# Patient Record
Sex: FEMALE | Race: WHITE | NOT HISPANIC OR LATINO | Employment: FULL TIME | URBAN - METROPOLITAN AREA
[De-identification: names, ages, dates, MRNs, and addresses within clinical notes are randomized per-mention and may not be internally consistent; named-entity substitution may affect disease eponyms.]

---

## 2022-04-04 ENCOUNTER — OFFICE VISIT (OUTPATIENT)
Dept: FAMILY MEDICINE CLINIC | Facility: CLINIC | Age: 22
End: 2022-04-04

## 2022-04-04 VITALS
HEIGHT: 64 IN | TEMPERATURE: 98.3 F | OXYGEN SATURATION: 99 % | HEART RATE: 100 BPM | WEIGHT: 125 LBS | RESPIRATION RATE: 20 BRPM | DIASTOLIC BLOOD PRESSURE: 77 MMHG | SYSTOLIC BLOOD PRESSURE: 131 MMHG | BODY MASS INDEX: 21.34 KG/M2

## 2022-04-04 DIAGNOSIS — N89.8 SKIN TAG OF VAGINAL MUCOSA: ICD-10-CM

## 2022-04-04 DIAGNOSIS — F43.10 PTSD (POST-TRAUMATIC STRESS DISORDER): ICD-10-CM

## 2022-04-04 DIAGNOSIS — Z00.00 ANNUAL PHYSICAL EXAM: Primary | ICD-10-CM

## 2022-04-04 DIAGNOSIS — Z23 IMMUNIZATION DUE: ICD-10-CM

## 2022-04-04 DIAGNOSIS — F33.41 RECURRENT MAJOR DEPRESSIVE DISORDER, IN PARTIAL REMISSION (HCC): ICD-10-CM

## 2022-04-04 DIAGNOSIS — Z11.59 ENCOUNTER FOR HEPATITIS C SCREENING TEST FOR LOW RISK PATIENT: ICD-10-CM

## 2022-04-04 DIAGNOSIS — Z11.4 ENCOUNTER FOR SCREENING FOR HIV: ICD-10-CM

## 2022-04-04 DIAGNOSIS — F41.9 ANXIETY: ICD-10-CM

## 2022-04-04 PROCEDURE — 99385 PREV VISIT NEW AGE 18-39: CPT | Performed by: FAMILY MEDICINE

## 2022-04-04 PROCEDURE — 99213 OFFICE O/P EST LOW 20 MIN: CPT | Performed by: FAMILY MEDICINE

## 2022-04-04 PROCEDURE — 90471 IMMUNIZATION ADMIN: CPT | Performed by: FAMILY MEDICINE

## 2022-04-04 PROCEDURE — 90651 9VHPV VACCINE 2/3 DOSE IM: CPT | Performed by: FAMILY MEDICINE

## 2022-04-04 RX ORDER — HYDROXYZINE HYDROCHLORIDE 25 MG/1
25 TABLET, FILM COATED ORAL EVERY 6 HOURS PRN
Qty: 30 TABLET | Refills: 0 | Status: SHIPPED | OUTPATIENT
Start: 2022-04-04

## 2022-04-04 NOTE — ASSESSMENT & PLAN NOTE
Diagnosed in 2019, currently well-controlled with talk therapy with therapist consistently 2 times/month  Currently not medically treated    - Recommended consistent follow-up with therapist   - Follow-up as needed for symptoms management

## 2022-04-04 NOTE — ASSESSMENT & PLAN NOTE
Moderately controlled  JINA-7 score: 17  First diagnosed in 2019  Patient able to manage symptoms on most days without medication but has been prescribed Xanax 25mg and Adderall as needed  In the past, has only taken them 1-2 times a month and committed to reducing use  Follows with therapist for past 3 years twice a month     Plan:  - Counseled patient on deep breathing techniques to practice throughout the day and when anxiety attack is coming on  - Prescription for atarax 25 mg provided   - Follow-up in 1 year for annual visit or earlier if symptoms worsen

## 2022-04-04 NOTE — PROGRESS NOTES
Family Practice  Kurt Garcia 19 y o  female MRN: [id number]  Encounter: [id number]  4/4/2022      Assessment/Plan:     Kurt Garcia is a 19 y o  female     Problem List Items Addressed This Visit        Genitourinary    Skin tag of vaginal mucosa     Acute concern  Had been evaluated by her OB/GYN and was given topical treatment at the time  Tag is still present and patient is bothered by presence  Denies burning, itching, or discharge from vagina  Plan:  - Referral to gynecology for evaluation   - Follow-up at next office visit for additional concerns         Relevant Orders    Ambulatory Referral to Gynecology       Other    Anxiety     Moderately controlled  JINA-7 score: 17  First diagnosed in 2019  Patient able to manage symptoms on most days without medication but has been prescribed Xanax 25mg and Adderall as needed  In the past, has only taken them 1-2 times a month and committed to reducing use  Follows with therapist for past 3 years twice a month  Plan:  - Counseled patient on deep breathing techniques to practice throughout the day and when anxiety attack is coming on  - Prescription for atarax 25 mg provided   - Follow-up in 1 year for annual visit or earlier if symptoms worsen         Relevant Medications    hydrOXYzine HCL (ATARAX) 25 mg tablet    Recurrent major depressive disorder, in partial remission ([facility] [address] )     Diagnosed in 2019, currently well-controlled with talk therapy with therapist consistently 2 times/month  PHQ-9 negative for depression  Had been prescribed SSRI in the past which did not improve symptoms  Currently not medically treated  - Recommended consistent follow-up with therapist   - Follow-up in as needed for symptom management          Relevant Medications    hydrOXYzine HCL (ATARAX) 25 mg tablet    PTSD (post-traumatic stress disorder)     Diagnosed in 2019, currently well-controlled with talk therapy with therapist consistently 2 times/month   Currently not medically treated  - Recommended consistent follow-up with therapist   - Follow-up as needed for symptoms management          Relevant Medications    hydrOXYzine HCL (ATARAX) 25 mg tablet    Annual physical exam - Primary     Patient denies acute symptoms of concern  Received HPV vaccine (1 of 3) today in the office  Deferring HIV and Hep C screening until next round of blood work due  Patient is otherwise up to date on cervical cancer screening and STD testing per annual gyn visit 2 months ago  Other Visit Diagnoses     Encounter for hepatitis C screening test for low risk patient        Encounter for screening for HIV        Immunization due        Relevant Orders    HPV VACCINE 9 VALENT IM (Completed)                Follow up appointment: as needed   ________________________________________________________________________  Subjective:         HPI:  Yaneth Ricketts is a 19 y o  female presents to the office for management of anxiety  Patient has a history of anxiety, depression, and PTSD diagnosed in 2019  Prescribed Xanax 25mg as needed and which helped her and was taking it once a week  Adderal was prescribed monthly which she also used as needed  Not taking either right now, but would like a prescription for Xanax to have as a last resort when needed  Has a therapist she sees twice a month and talk therapy has helped control her depression and PTSD  Remains vigilant to use non-pharmacologic therapy to manage anxiety and use medications only when absolutely needed  Review of Systems   Constitutional: Negative for chills and fever  HENT: Negative for ear pain and sore throat  Eyes: Negative for pain and visual disturbance  Respiratory: Negative for cough and shortness of breath  Cardiovascular: Negative for chest pain and palpitations  Gastrointestinal: Negative for abdominal pain and vomiting  Genitourinary: Negative for dysuria and hematuria     Musculoskeletal: Negative for arthralgias and back pain  Skin: Negative for color change and rash  Reports sensitivity of skin (welting with minor trauma) for past 2 years since her shellfish allergy (hives) was diagnosed   Neurological: Negative for seizures and syncope  Psychiatric/Behavioral: Negative for agitation, behavioral problems, confusion and decreased concentration  The patient is nervous/anxious  All other systems reviewed and are negative  Historical Information   No past medical history on file  Past Surgical History:   Procedure Laterality Date    TONSILLECTOMY  2017     Social History   Social History     Substance and Sexual Activity   Alcohol Use Yes    Comment: Occ     Social History     Substance and Sexual Activity   Drug Use Never     Social History     Tobacco Use   Smoking Status Never Smoker   Smokeless Tobacco Never Used     Family History   Problem Relation Age of Onset    No Known Problems Mother     Hypertension Father          Current Outpatient Medications:     hydrOXYzine HCL (ATARAX) 25 mg tablet, Take 1 tablet (25 mg total) by mouth every 6 (six) hours as needed for allergies or anxiety, Disp: 30 tablet, Rfl: 0       Meds/Allergies   (Not in a hospital admission)    No current facility-administered medications for this visit  Allergies   Allergen Reactions    Shellfish-Derived Products - Food Allergy Hives     Varying degress of severity, unsure of mechanism         Objective:     Blood pressure 131/77, pulse 100, temperature 98 3 °F (36 8 °C), temperature source Temporal, resp  rate 20, height 5' 4 2" (1 631 m), weight 56 7 kg (125 lb), SpO2 99 %  Body mass index is 21 32 kg/m²  Physical Exam  Vitals and nursing note reviewed  Constitutional:       General: She is not in acute distress  Appearance: She is well-developed  HENT:      Head: Normocephalic and atraumatic     Eyes:      Conjunctiva/sclera: Conjunctivae normal    Cardiovascular:      Rate and Rhythm: Normal rate and regular rhythm  Heart sounds: No murmur heard  Pulmonary:      Effort: Pulmonary effort is normal  No respiratory distress  Breath sounds: Normal breath sounds  Abdominal:      Palpations: Abdomen is soft  Tenderness: There is no abdominal tenderness  Musculoskeletal:      Cervical back: Neck supple  Skin:     General: Skin is warm and dry  Neurological:      Mental Status: She is alert  Psychiatric:         Mood and Affect: Mood normal          Behavior: Behavior normal          Thought Content: Thought content normal          Judgment: Judgment normal        LAB RESULTS:   Discussed recent pertinent labs with patient  RADIOLOGY RESULTS: I have personally reviewed pertinent imaging studies  Discussed recent pertinent labs with patient

## 2022-04-04 NOTE — ASSESSMENT & PLAN NOTE
Diagnosed in 2019, currently well-controlled with talk therapy with therapist consistently 2 times/month  PHQ-9 negative for depression  Had been prescribed SSRI in the past which did not improve symptoms  Currently not medically treated    - Recommended consistent follow-up with therapist   - Follow-up in as needed for symptom management

## 2022-04-04 NOTE — PROGRESS NOTES
[address] [facility]    NAME: Diane Elizalde  AGE: 19 y o  SEX: female  : 2000     DATE: 2022     Assessment and Plan:     Problem List Items Addressed This Visit        Genitourinary    Skin tag of vaginal mucosa     Acute concern  Had been evaluated by her OB/GYN and was given topical treatment at the time  Tag is still present and patient is bothered by presence  Denies burning, itching, or discharge from vagina  Plan:  - Referral to gynecology for evaluation   - Follow-up at next office visit for additional concerns         Relevant Orders    Ambulatory Referral to Gynecology       Other    Anxiety     Moderately controlled  JINA-7 score: 17  First diagnosed in 2019  Patient able to manage symptoms on most days without medication but has been prescribed Xanax 25mg and Adderall as needed  In the past, has only taken them 1-2 times a month and committed to reducing use  Follows with therapist for past 3 years twice a month  Plan:  - Counseled patient on deep breathing techniques to practice throughout the day and when anxiety attack is coming on  - Prescription for atarax 25 mg provided   - Follow-up in 1 year for annual visit or earlier if symptoms worsen         Relevant Medications    hydrOXYzine HCL (ATARAX) 25 mg tablet    Recurrent major depressive disorder, in partial remission ([facility] [address] )     Diagnosed in 2019, currently well-controlled with talk therapy with therapist consistently 2 times/month  PHQ-9 negative for depression  Had been prescribed SSRI in the past which did not improve symptoms  Currently not medically treated    - Recommended consistent follow-up with therapist   - Follow-up in as needed for symptom management          Relevant Medications    hydrOXYzine HCL (ATARAX) 25 mg tablet    PTSD (post-traumatic stress disorder)     Diagnosed in 2019, currently well-controlled with talk therapy with therapist consistently 2 times/month  Currently not medically treated  - Recommended consistent follow-up with therapist   - Follow-up as needed for symptoms management          Relevant Medications    hydrOXYzine HCL (ATARAX) 25 mg tablet    Annual physical exam - Primary     Patient denies acute symptoms of concern  Received HPV vaccine (1 of 3) today in the office  Deferring HIV and Hep C screening until next round of blood work due  Patient is otherwise up to date on cervical cancer screening and STD testing per annual gyn visit 2 months ago  Other Visit Diagnoses     Encounter for hepatitis C screening test for low risk patient        Encounter for screening for HIV        Immunization due        Relevant Orders    HPV VACCINE 9 VALENT IM (Completed)          Immunizations and preventive care screenings were discussed with patient today  Appropriate education was printed on patient's after visit summary  Counseling:  Alcohol/drug use: discussed moderation in alcohol intake, the recommendations for healthy alcohol use, and avoidance of illicit drug use  Dental Health: discussed importance of regular tooth brushing, flossing, and dental visits  Sexual health: discussed sexually transmitted diseases, partner selection, use of condoms, avoidance of unintended pregnancy, and contraceptive alternatives  · Exercise: the importance of regular exercise/physical activity was discussed  Recommend exercise 3-5 times per week for at least 30 minutes  Return if symptoms worsen or fail to improve, for make follow up apt for nursing visit for HPV in 6  months  Chief Complaint:     Chief Complaint   Patient presents with   [facility]     Patient would like skin tag to be removed on the vaginal area      History of Present Illness:     Adult Annual Physical   Patient here for a comprehensive physical exam  The patient reports no problems   Patient last saw PCP in 7/2020 for a physical and 6 months ago for follow-up, and would like to establish care in our office  Recently had Nexplanon removed and resumed normal menstrual cycles (currenlty having menses)  Denies itching, burning, pain in vaginal area  Has a skin tag in the vaginal area that she would like removed  Diet and Physical Activity  · Diet/Nutrition: well balanced diet, consuming 3-5 servings of fruits/vegetables daily and adequate fiber intake  · Exercise: moderate cardiovascular exercise, 1-2 times a week on average and yoga  Depression Screening  PHQ-2/9 Depression Screening    Little interest or pleasure in doing things: 0 - not at all  Feeling down, depressed, or hopeless: 1 - several days  PHQ-2 Score: 1  PHQ-2 Interpretation: Negative depression screen       General Health  · Sleep: sleeps well and gets 7-8 hours of sleep on average  · Hearing: normal - bilateral   · Vision: vision problems: nearsighted, most recent eye exam >1 year ago and wears glasses and contacts  · Dental: no dental visits for >1 year and has concern that one of her teeth is worn down, previously had casing placed on one of them   /GYN Health  · Last menstrual period: Currently on her period, first since Nexplanon removal March 9th 2022   · Contraceptive method: none  · History of STDs?: no      Review of Systems:     Review of Systems   Constitutional: Negative for activity change, appetite change, chills, fever and unexpected weight change  HENT: Negative for congestion, ear discharge, ear pain, hearing loss and sore throat  Eyes: Negative for pain and visual disturbance  Respiratory: Negative for cough and shortness of breath  Cardiovascular: Negative for chest pain and palpitations  Gastrointestinal: Negative for abdominal pain and vomiting  Genitourinary: Negative for dysuria and hematuria  Musculoskeletal: Negative for arthralgias and back pain  Skin: Negative for color change and rash          Notices welting of skin, more in the cold and if anything scratches her   Neurological: Negative for seizures and syncope  All other systems reviewed and are negative  Past Medical History:     No past medical history on file  Past Surgical History:     Past Surgical History:   Procedure Laterality Date    TONSILLECTOMY  2017      Social History:     Social History     Socioeconomic History    Marital status: Single     Spouse name: Not on file    Number of children: 0    Years of education: Not on file    Highest education level: Not on file   Occupational History    Not on file   Tobacco Use    Smoking status: Never Smoker    Smokeless tobacco: Never Used   Vaping Use    Vaping Use: Never used   Substance and Sexual Activity    Alcohol use: Yes     Comment: Occ    Drug use: Never    Sexual activity: Yes     Partners: Male   Other Topics Concern    Not on file   Social History Narrative    Not on file     Social Determinants of Health     Financial Resource Strain: Low Risk     Difficulty of Paying Living Expenses: Not hard at all   Food Insecurity: No Food Insecurity    Worried About Running Out of Food in the Last Year: Never true    Karina of Food in the Last Year: Never true   Transportation Needs: No Transportation Needs    Lack of Transportation (Medical): No    Lack of Transportation (Non-Medical):  No   Physical Activity: Inactive    Days of Exercise per Week: 0 days    Minutes of Exercise per Session: 0 min   Stress: No Stress Concern Present    Feeling of Stress : Not at all   Social Connections: Socially Isolated    Frequency of Communication with Friends and Family: More than three times a week    Frequency of Social Gatherings with Friends and Family: More than three times a week    Attends [employer] Services: Never    Active Member of Clubs or Organizations: No    Attends Club or Organization Meetings: Never    Marital Status: Never    Intimate Partner Violence: Not At Risk    Fear of Current or Ex-Partner: No    Emotionally Abused: No    Physically Abused: No    Sexually Abused: No   Housing Stability: Low Risk     Unable to Pay for Housing in the Last Year: No    Number of Places Lived in the Last Year: 1    Unstable Housing in the Last Year: No      Family History:     Family History   Problem Relation Age of Onset    No Known Problems Mother     Hypertension Father       Current Medications:     Current Outpatient Medications   Medication Sig Dispense Refill    hydrOXYzine HCL (ATARAX) 25 mg tablet Take 1 tablet (25 mg total) by mouth every 6 (six) hours as needed for allergies or anxiety 30 tablet 0     No current facility-administered medications for this visit  Allergies: Allergies   Allergen Reactions    Shellfish-Derived Products - Food Allergy Hives     Varying degress of severity, unsure of mechanism      Physical Exam:     /77 (BP Location: Right arm, Patient Position: Sitting, Cuff Size: Standard)   Pulse 100   Temp 98 3 °F (36 8 °C) (Temporal)   Resp 20   Ht 5' 4 2" (1 631 m)   Wt 56 7 kg (125 lb)   SpO2 99%   BMI 21 32 kg/m²     Physical Exam  Vitals and nursing note reviewed  Constitutional:       General: She is not in acute distress  Appearance: Normal appearance  She is well-developed  HENT:      Head: Normocephalic and atraumatic  Right Ear: Tympanic membrane, ear canal and external ear normal  There is no impacted cerumen  Left Ear: Tympanic membrane, ear canal and external ear normal  There is no impacted cerumen  Eyes:      Conjunctiva/sclera: Conjunctivae normal    Cardiovascular:      Rate and Rhythm: Normal rate and regular rhythm  Heart sounds: No murmur heard  Pulmonary:      Effort: Pulmonary effort is normal  No respiratory distress  Breath sounds: Normal breath sounds  Abdominal:      Palpations: Abdomen is soft  Tenderness: There is no abdominal tenderness     Musculoskeletal: General: Normal range of motion  Cervical back: Neck supple  Skin:     General: Skin is warm and dry  Neurological:      General: No focal deficit present  Mental Status: She is alert and oriented to person, place, and time  Psychiatric:         Mood and Affect: Mood normal          Behavior: Behavior normal          Thought Content:  Thought content normal          Judgment: Judgment normal           Chelsey Kat MD   [address]

## 2022-04-04 NOTE — ASSESSMENT & PLAN NOTE
Acute concern  Had been evaluated by her OB/GYN and was given topical treatment at the time  Tag is still present and patient is bothered by presence  Denies burning, itching, or discharge from vagina     Plan:  - Referral to gynecology for evaluation   - Follow-up at next office visit for additional concerns

## 2022-04-04 NOTE — ASSESSMENT & PLAN NOTE
Patient denies acute symptoms of concern  Received HPV vaccine (1 of 3) today in the office  Deferring HIV and Hep C screening until next round of blood work due  Patient is otherwise up to date on cervical cancer screening and STD testing per annual gyn visit 2 months ago

## 2022-04-04 NOTE — PATIENT INSTRUCTIONS

## 2022-04-28 ENCOUNTER — OFFICE VISIT (OUTPATIENT)
Dept: OBGYN CLINIC | Facility: CLINIC | Age: 22
End: 2022-04-28

## 2022-04-28 VITALS
DIASTOLIC BLOOD PRESSURE: 78 MMHG | HEIGHT: 64 IN | BODY MASS INDEX: 21.82 KG/M2 | HEART RATE: 76 BPM | WEIGHT: 127.8 LBS | SYSTOLIC BLOOD PRESSURE: 119 MMHG

## 2022-04-28 DIAGNOSIS — N89.8 SKIN TAG OF VAGINAL MUCOSA: Primary | ICD-10-CM

## 2022-04-28 PROCEDURE — 99213 OFFICE O/P EST LOW 20 MIN: CPT | Performed by: OBSTETRICS & GYNECOLOGY

## 2022-04-28 PROCEDURE — 88305 TISSUE EXAM BY PATHOLOGIST: CPT | Performed by: PATHOLOGY

## 2022-04-28 PROCEDURE — 56605 BIOPSY OF VULVA/PERINEUM: CPT | Performed by: OBSTETRICS & GYNECOLOGY

## 2022-04-28 NOTE — PROGRESS NOTES
Biopsy    Date/Time: 4/28/2022 2:51 PM  Performed by: Gil Post MD  Authorized by: Gil Post MD   Universal Protocol:  Procedure performed by:  Consent: Verbal consent obtained  Written consent obtained  Risks and benefits: risks, benefits and alternatives were discussed  Consent given by: patient  Time out: Immediately prior to procedure a "time out" was called to verify the correct patient, procedure, equipment, support staff and site/side marked as required  Timeout called at: 4/28/2022 2:43 AM   Patient understanding: patient states understanding of the procedure being performed  Patient consent: the patient's understanding of the procedure matches consent given  Procedure consent: procedure consent matches procedure scheduled  Relevant documents: relevant documents present and verified  Test results: test results available and properly labeled  Site marked: the operative site was not marked  Radiology Images displayed and confirmed  If images not available, report reviewed: imaging studies not available  Patient identity confirmed: verbally with patient      Procedure Details - Skin Biopsy:     Biopsy tissue type: skin    Body area:   Anogenital    Anogenital location:  Vulva    Initial size (mm):  5    Final defect size (mm):  3    Malignancy: benign lesion       Waldemar Rangel MD  OBGYN, PGY-4  4/28/2022  2:52 PM

## 2022-05-10 ENCOUNTER — OFFICE VISIT (OUTPATIENT)
Dept: OBGYN CLINIC | Facility: CLINIC | Age: 22
End: 2022-05-10

## 2022-05-10 VITALS
SYSTOLIC BLOOD PRESSURE: 113 MMHG | DIASTOLIC BLOOD PRESSURE: 77 MMHG | WEIGHT: 127 LBS | BODY MASS INDEX: 21.68 KG/M2 | HEART RATE: 85 BPM | HEIGHT: 64 IN

## 2022-05-10 DIAGNOSIS — Z12.4 PAP SMEAR FOR CERVICAL CANCER SCREENING: Primary | ICD-10-CM

## 2022-05-10 PROCEDURE — G0145 SCR C/V CYTO,THINLAYER,RESCR: HCPCS | Performed by: NURSE PRACTITIONER

## 2022-05-10 PROCEDURE — 99213 OFFICE O/P EST LOW 20 MIN: CPT | Performed by: NURSE PRACTITIONER

## 2022-05-10 NOTE — PROGRESS NOTES
Assessment/Plan:    No problem-specific Assessment & Plan notes found for this encounter  Diagnoses and all orders for this visit:    Pap smear for cervical cancer screening  -     Liquid-based pap, screening  Will call results patient        Subjective:      Patient ID: Cindy Dukes is a 19 y o  female  HPI 57-year-old G0 due for Pap only  She had a vulvar biopsy done for a skin tag 52/60/4272 with Dr Becki Reyes  Patient reports that at  her last annual her physician did not do a Pap smear and was recommended for patient to return to office to complete  She denies abnormal Pap history  Patient has OCP's that were provided from her previous physician and patient will start  She reports she has [facility] OCPs  We discussed ACHES with OCP use  Recommend for patient to begin OCPs on Sunday after her next menses  Reviewed backup method  Her LMP was 04/27/2022  Recommend for her to follow-up in 3 months for pill check  She has received 1 dose of Gardasil through her PCP and will continue to get the other 2 doses when due    The following portions of the patient's history were reviewed and updated as appropriate: allergies, current medications, past family history, past medical history, past social history, past surgical history and problem list     Review of Systems   Constitutional: Negative for chills and fever  Eyes: Negative for photophobia and visual disturbance  Respiratory: Negative  Cardiovascular: Negative  Gastrointestinal: Negative for abdominal pain  Genitourinary: Negative for dysuria, menstrual problem and vaginal discharge  Neurological: Negative for dizziness and headaches  Objective:      /77 (BP Location: Left arm, Patient Position: Sitting, Cuff Size: Adult)   Pulse 85   Ht 5' 4" (1 626 m)   Wt 57 6 kg (127 lb)   LMP 04/27/2022   BMI 21 80 kg/m²          Physical Exam  Constitutional:       Appearance: Normal appearance     Cardiovascular:      Rate and Rhythm: Normal rate  Pulmonary:      Effort: Pulmonary effort is normal    Abdominal:      Palpations: Abdomen is soft  Tenderness: There is no abdominal tenderness  Skin:     General: Skin is warm and dry  Neurological:      Mental Status: She is oriented to person, place, and time     Psychiatric:         Mood and Affect: Mood normal          Behavior: Behavior normal        external genitalia-no lesions or erythema  Vagina-small amount white discharge  Cervix-negative CMT  Uterus-anteverted, nontender  Adnexa-no masses nontender

## 2022-05-18 LAB
LAB AP GYN PRIMARY INTERPRETATION: NORMAL
Lab: NORMAL

## 2022-06-07 ENCOUNTER — CLINICAL SUPPORT (OUTPATIENT)
Dept: OBGYN CLINIC | Facility: CLINIC | Age: 22
End: 2022-06-07

## 2022-06-07 DIAGNOSIS — Z23 NEED FOR HPV VACCINATION: Primary | ICD-10-CM

## 2022-06-07 PROCEDURE — 90471 IMMUNIZATION ADMIN: CPT

## 2022-06-07 PROCEDURE — 90651 9VHPV VACCINE 2/3 DOSE IM: CPT

## 2022-06-07 NOTE — PROGRESS NOTES
Gardasil injection #2 given in left deltoid today  Pt tolerated well  3rd injection scheduled for Oct 2022

## 2022-06-13 ENCOUNTER — HOSPITAL ENCOUNTER (EMERGENCY)
Facility: HOSPITAL | Age: 22
Discharge: HOME | End: 2022-06-13
Attending: EMERGENCY MEDICINE | Admitting: EMERGENCY MEDICINE
Payer: COMMERCIAL

## 2022-06-13 VITALS
DIASTOLIC BLOOD PRESSURE: 97 MMHG | HEART RATE: 81 BPM | HEIGHT: 64 IN | TEMPERATURE: 99 F | BODY MASS INDEX: 21.68 KG/M2 | RESPIRATION RATE: 16 BRPM | OXYGEN SATURATION: 100 % | SYSTOLIC BLOOD PRESSURE: 143 MMHG | WEIGHT: 127 LBS

## 2022-06-13 DIAGNOSIS — G43.909 MIGRAINE: Primary | ICD-10-CM

## 2022-06-13 DIAGNOSIS — R20.2 PARESTHESIAS: ICD-10-CM

## 2022-06-13 PROCEDURE — 96365 THER/PROPH/DIAG IV INF INIT: CPT

## 2022-06-13 PROCEDURE — 99283 EMERGENCY DEPT VISIT LOW MDM: CPT

## 2022-06-13 PROCEDURE — 99284 EMERGENCY DEPT VISIT MOD MDM: CPT | Performed by: EMERGENCY MEDICINE

## 2022-06-13 PROCEDURE — 96375 TX/PRO/DX INJ NEW DRUG ADDON: CPT

## 2022-06-13 PROCEDURE — 96368 THER/DIAG CONCURRENT INF: CPT

## 2022-06-13 RX ORDER — MAGNESIUM SULFATE HEPTAHYDRATE 40 MG/ML
2 INJECTION, SOLUTION INTRAVENOUS ONCE
Status: COMPLETED | OUTPATIENT
Start: 2022-06-13 | End: 2022-06-13

## 2022-06-13 RX ORDER — DEXAMETHASONE SODIUM PHOSPHATE 10 MG/ML
10 INJECTION, SOLUTION INTRAMUSCULAR; INTRAVENOUS ONCE
Status: COMPLETED | OUTPATIENT
Start: 2022-06-13 | End: 2022-06-13

## 2022-06-13 RX ORDER — KETOROLAC TROMETHAMINE 30 MG/ML
30 INJECTION, SOLUTION INTRAMUSCULAR; INTRAVENOUS ONCE
Status: COMPLETED | OUTPATIENT
Start: 2022-06-13 | End: 2022-06-13

## 2022-06-13 RX ORDER — DIPHENHYDRAMINE HYDROCHLORIDE 50 MG/ML
25 INJECTION INTRAMUSCULAR; INTRAVENOUS ONCE
Status: COMPLETED | OUTPATIENT
Start: 2022-06-13 | End: 2022-06-13

## 2022-06-13 RX ORDER — METOCLOPRAMIDE HYDROCHLORIDE 5 MG/ML
10 INJECTION INTRAMUSCULAR; INTRAVENOUS ONCE
Status: COMPLETED | OUTPATIENT
Start: 2022-06-13 | End: 2022-06-13

## 2022-06-13 RX ADMIN — METOCLOPRAMIDE HYDROCHLORIDE 10 MG: 5 INJECTION INTRAMUSCULAR; INTRAVENOUS at 17:28

## 2022-06-13 RX ADMIN — KETOROLAC TROMETHAMINE 30 MG: 30 INJECTION, SOLUTION INTRAMUSCULAR at 17:29

## 2022-06-13 RX ADMIN — DIPHENHYDRAMINE HYDROCHLORIDE 25 MG: 50 INJECTION INTRAMUSCULAR; INTRAVENOUS at 17:29

## 2022-06-13 RX ADMIN — SODIUM CHLORIDE, SODIUM LACTATE, POTASSIUM CHLORIDE, AND CALCIUM CHLORIDE 1000 ML: .6; .31; .03; .02 INJECTION, SOLUTION INTRAVENOUS at 17:34

## 2022-06-13 RX ADMIN — MAGNESIUM SULFATE HEPTAHYDRATE 2 G: 40 INJECTION, SOLUTION INTRAVENOUS at 17:55

## 2022-06-13 RX ADMIN — DEXAMETHASONE SODIUM PHOSPHATE 10 MG: 10 INJECTION, SOLUTION INTRAMUSCULAR; INTRAVENOUS at 17:29

## 2022-06-13 NOTE — ED PROVIDER NOTES
History  Chief Complaint   Patient presents with    Medical Problem     Pt presents to ed via walk in after she got her second gardacel shot and since them has been having intermittent right side of head headaches and right hand pain/numbness intermittent and now today has left side head pain      Several days of intermittent headache to right side of head, associated with paresthesias right side of tongue and right hand  At present no paresthesias  No numbness  No motor loss/visual changes/speech changes/ataxia  Began after 2nd guardasil vaccination  No f/c/neck stiffness/rash/photophobia/cp/sob/abdo pain/urinary/bowel symp          Prior to Admission Medications   Prescriptions Last Dose Informant Patient Reported? Taking?   hydrOXYzine HCL (ATARAX) 25 mg tablet 6/12/2022 at Unknown time  No Yes   Sig: Take 1 tablet (25 mg total) by mouth every 6 (six) hours as needed for allergies or anxiety      Facility-Administered Medications: None       Past Medical History:   Diagnosis Date    Anxiety     Depression     PTSD (post-traumatic stress disorder)        Past Surgical History:   Procedure Laterality Date    KNEE SURGERY  2016    TONSILLECTOMY  2017       Family History   Problem Relation Age of Onset    No Known Problems Mother     Hypertension Father     No Known Problems Brother     Colon cancer Maternal Grandmother     Lung cancer Maternal Grandfather     No Known Problems Paternal Grandmother     Diabetes Paternal Grandfather     Breast cancer Neg Hx     Ovarian cancer Neg Hx      I have reviewed and agree with the history as documented  E-Cigarette/Vaping    E-Cigarette Use Never User      E-Cigarette/Vaping Substances    Nicotine No     THC No     CBD No     Flavoring No     Other No     Unknown No      Social History     Tobacco Use    Smoking status: Never Smoker    Smokeless tobacco: Never Used   Vaping Use    Vaping Use: Never used   Substance Use Topics    Alcohol use:  Yes Comment: Occ    Drug use: Never       Review of Systems   Constitutional: Negative for fever  HENT: Negative for rhinorrhea  Eyes: Negative for visual disturbance  Respiratory: Negative for shortness of breath  Cardiovascular: Negative for chest pain  Gastrointestinal: Negative for abdominal pain, diarrhea and vomiting  Endocrine: Negative for polydipsia  Genitourinary: Negative for dysuria, frequency and hematuria  Musculoskeletal: Negative for neck stiffness  Skin: Negative for rash  Allergic/Immunologic: Negative for immunocompromised state  Neurological: Positive for headaches  Negative for speech difficulty, weakness and numbness  Psychiatric/Behavioral: Negative for suicidal ideas  Physical Exam  Physical Exam  Constitutional:       General: She is not in acute distress  HENT:      Head: Normocephalic and atraumatic  Right Ear: External ear normal       Left Ear: External ear normal       Nose: Nose normal       Mouth/Throat:      Pharynx: Oropharynx is clear  Eyes:      Conjunctiva/sclera: Conjunctivae normal    Cardiovascular:      Rate and Rhythm: Normal rate and regular rhythm  Heart sounds: Normal heart sounds  No murmur heard  Pulmonary:      Effort: Pulmonary effort is normal       Breath sounds: Normal breath sounds  Abdominal:      General: Bowel sounds are normal       Palpations: Abdomen is soft  There is no mass  Tenderness: There is no abdominal tenderness  There is no guarding  Musculoskeletal:         General: No swelling or tenderness  Cervical back: Normal range of motion and neck supple  Right lower leg: No edema  Left lower leg: No edema  Skin:     General: Skin is warm and dry  Capillary Refill: Capillary refill takes less than 2 seconds  Neurological:      General: No focal deficit present  Mental Status: She is alert and oriented to person, place, and time  Mental status is at baseline        Cranial Nerves: No cranial nerve deficit  Sensory: No sensory deficit  Motor: No weakness  Coordination: Coordination normal       Gait: Gait normal       Deep Tendon Reflexes: Reflexes normal    Psychiatric:         Mood and Affect: Mood normal          Vital Signs  ED Triage Vitals [06/13/22 1638]   Temperature Pulse Respirations Blood Pressure SpO2   99 °F (37 2 °C) 81 16 143/97 100 %      Temp Source Heart Rate Source Patient Position - Orthostatic VS BP Location FiO2 (%)   Oral Monitor Sitting Left arm --      Pain Score       6           Vitals:    06/13/22 1638   BP: 143/97   Pulse: 81   Patient Position - Orthostatic VS: Sitting         Visual Acuity      ED Medications  Medications   metoclopramide (REGLAN) injection 10 mg (10 mg Intravenous Given 6/13/22 1728)   diphenhydrAMINE (BENADRYL) injection 25 mg (25 mg Intravenous Given 6/13/22 1729)   dexamethasone (PF) (DECADRON) injection 10 mg (10 mg Intravenous Given 6/13/22 1729)   ketorolac (TORADOL) injection 30 mg (30 mg Intravenous Given 6/13/22 1729)   lactated ringers bolus 1,000 mL (0 mL Intravenous Stopped 6/13/22 1819)   magnesium sulfate 2 g/50 mL IVPB (premix) 2 g (0 g Intravenous Stopped 6/13/22 1819)       Diagnostic Studies  Results Reviewed     None                 No orders to display              Procedures  Procedures         ED Course  ED Course as of 06/13/22 2055 Mon Jun 13, 2022 1802 Symptoms resolved                               SBIRT 20yo+    Flowsheet Row Most Recent Value   SBIRT (23 yo +)    In order to provide better care to our patients, we are screening all of our patients for alcohol and drug use  Would it be okay to ask you these screening questions? Yes Filed at: 06/13/2022 1813   Initial Alcohol Screen: US AUDIT-C     1  How often do you have a drink containing alcohol? 0 Filed at: 06/13/2022 1813   2  How many drinks containing alcohol do you have on a typical day you are drinking?   0 Filed at: 06/13/2022 1813 3a  Male UNDER 65: How often do you have five or more drinks on one occasion? 0 Filed at: 06/13/2022 1813   3b  FEMALE Any Age, or MALE 65+: How often do you have 4 or more drinks on one occassion? 0 Filed at: 06/13/2022 1813   Audit-C Score 0 Filed at: 06/13/2022 1813   HONEY: How many times in the past year have you    Used an illegal drug or used a prescription medication for non-medical reasons? Never Filed at: 06/13/2022 1813                    MDM  Number of Diagnoses or Management Options  Migraine  Paresthesias  Diagnosis management comments: Likely migraine, improved here with migraine rx, dc home      Disposition  Final diagnoses:   Migraine   Paresthesias     Time reflects when diagnosis was documented in both MDM as applicable and the Disposition within this note     Time User Action Codes Description Comment    6/13/2022  6:02 PM Roxana West [U43 675] Migraine     6/13/2022  6:02 PM Delvis Ji Add [R20 2] Paresthesias       ED Disposition     ED Disposition   Discharge    Condition   Stable    Date/Time   Mon Jun 13, 2022  6:02 PM    Comment   Noris Standard discharge to home/self care  Follow-up Information     Follow up With Specialties Details Why Contact Info Additional Information    [facility] Medicine Schedule an appointment as soon as possible for a visit in 2 days  [address] [id number]  [id number]  , [place], [place], [address]          Discharge Medication List as of 6/13/2022  6:03 PM      CONTINUE these medications which have NOT CHANGED    Details   hydrOXYzine HCL (ATARAX) 25 mg tablet Take 1 tablet (25 mg total) by mouth every 6 (six) hours as needed for allergies or anxiety, Starting Mon 4/4/2022, Normal             No discharge procedures on file      PDMP Review     None          ED Provider  Electronically Signed by           Jorge Todd Ivonne Araujo MD  06/13/22 4997

## 2022-07-19 ENCOUNTER — TELEPHONE (OUTPATIENT)
Dept: OBGYN CLINIC | Facility: CLINIC | Age: 22
End: 2022-07-19

## 2022-07-21 ENCOUNTER — OFFICE VISIT (OUTPATIENT)
Dept: OBGYN CLINIC | Facility: CLINIC | Age: 22
End: 2022-07-21

## 2022-07-21 VITALS
HEART RATE: 87 BPM | HEIGHT: 64 IN | BODY MASS INDEX: 20.18 KG/M2 | WEIGHT: 118.2 LBS | SYSTOLIC BLOOD PRESSURE: 117 MMHG | DIASTOLIC BLOOD PRESSURE: 82 MMHG

## 2022-07-21 DIAGNOSIS — Z30.41 ENCOUNTER FOR SURVEILLANCE OF CONTRACEPTIVE PILLS: Primary | ICD-10-CM

## 2022-07-21 PROCEDURE — 99213 OFFICE O/P EST LOW 20 MIN: CPT | Performed by: NURSE PRACTITIONER

## 2022-07-21 RX ORDER — NORGESTIMATE AND ETHINYL ESTRADIOL 0.25-0.035
1 KIT ORAL DAILY
Qty: 28 TABLET | Refills: 10 | Status: SHIPPED | OUTPATIENT
Start: 2022-07-21 | End: 2023-05-21

## 2022-07-21 NOTE — PROGRESS NOTES
Assessment/Plan:    No problem-specific Assessment & Plan notes found for this encounter  Annual due 05/2023     Diagnoses and all orders for this visit:    Encounter for surveillance of contraceptive pills  -     norgestimate-ethinyl estradiol (Sprintec 28) 0 25-35 MG-MCG per tablet; Take 1 tablet by mouth daily      reviewed precautions with OCPs, risk, benefits, ACHES, backup method    Subjective:      Patient ID: Nadege Akins is a 19 y o  female  HPI a 17-year-old here for contraceptive check  Patient is on OCPs that were prescribed previously in another office and needs refills  Her last blood pressure 143/97, was increased, was in the ER at that time,  recommend for her to come to office for follow-up  Blood pressure today is normal at 117/82  Her weight is 7 lb from last visit  She denies having any weight loss but does report her weight will fluctuate certain times and has always done that  She is currently on Jayne and has been on for 2 months  She did have breakthrough bleeding 2nd week of her pills  she desires to try another pill reports her acne may have worsened  Reviewed will try Sprintec, it is also a low androgenic pill  Patient in pill free week, will start new pack on Sunday  Reviewed backup method    She had a negative Pap 05/10/2022  History of vulvar shave 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 Verruca  vulgaris      Patient has received Gardasil 2 , Gardasil 3 due 10/22     The following portions of the patient's history were reviewed and updated as appropriate: allergies, current medications, past family history, past medical history, past social history, past surgical history and problem list     Review of Systems   Constitutional: Negative for fever  Respiratory: Negative  Cardiovascular: Negative  Genitourinary: Positive for menstrual problem  Negative for dyspareunia and vaginal discharge           Objective:      /82 (BP Location: Left arm, Patient Position: Sitting, Cuff Size: Adult)   Pulse 87   Ht 5' 4" (1 626 m)   Wt 53 6 kg (118 lb 3 2 oz)   LMP 06/30/2022 (Approximate)   BMI 20 29 kg/m²          Physical Exam  Constitutional:       Appearance: Normal appearance  Cardiovascular:      Rate and Rhythm: Normal rate  Pulmonary:      Effort: Pulmonary effort is normal    Abdominal:      Palpations: Abdomen is soft  Tenderness: There is no abdominal tenderness  Neurological:      Mental Status: She is oriented to person, place, and time     Psychiatric:         Mood and Affect: Mood normal          Behavior: Behavior normal

## 2022-09-02 ENCOUNTER — OFFICE VISIT (OUTPATIENT)
Dept: FAMILY MEDICINE CLINIC | Facility: CLINIC | Age: 22
End: 2022-09-02

## 2022-09-02 ENCOUNTER — TELEPHONE (OUTPATIENT)
Dept: PSYCHIATRY | Facility: CLINIC | Age: 22
End: 2022-09-02

## 2022-09-02 VITALS
HEART RATE: 86 BPM | OXYGEN SATURATION: 98 % | BODY MASS INDEX: 20.98 KG/M2 | DIASTOLIC BLOOD PRESSURE: 68 MMHG | TEMPERATURE: 98.7 F | SYSTOLIC BLOOD PRESSURE: 117 MMHG | WEIGHT: 122.2 LBS

## 2022-09-02 DIAGNOSIS — M54.2 NECK PAIN: ICD-10-CM

## 2022-09-02 DIAGNOSIS — Z59.89 UNINSURED: ICD-10-CM

## 2022-09-02 DIAGNOSIS — F33.41 RECURRENT MAJOR DEPRESSIVE DISORDER, IN PARTIAL REMISSION (HCC): ICD-10-CM

## 2022-09-02 DIAGNOSIS — G43.019 INTRACTABLE MIGRAINE WITHOUT AURA AND WITHOUT STATUS MIGRAINOSUS: Primary | ICD-10-CM

## 2022-09-02 PROCEDURE — 99213 OFFICE O/P EST LOW 20 MIN: CPT | Performed by: FAMILY MEDICINE

## 2022-09-02 RX ORDER — LIDOCAINE 50 MG/G
1 PATCH TOPICAL DAILY
Qty: 30 PATCH | Refills: 2 | Status: SHIPPED | OUTPATIENT
Start: 2022-09-02

## 2022-09-02 RX ORDER — SUMATRIPTAN 25 MG/1
25 TABLET, FILM COATED ORAL ONCE AS NEEDED
Qty: 10 TABLET | Refills: 0 | Status: SHIPPED | OUTPATIENT
Start: 2022-09-02

## 2022-09-02 SDOH — ECONOMIC STABILITY - INCOME SECURITY: OTHER PROBLEMS RELATED TO HOUSING AND ECONOMIC CIRCUMSTANCES: Z59.89

## 2022-09-02 NOTE — ASSESSMENT & PLAN NOTE
- new onset 1+ month of recurrent unilateral headaches that are intractable with tylenol or excedrin usage; associated with onset of several life stressors (work, relationships, financially)   - start imitrex 25mg PRN  Advised that if headache not relieved within 30minutes of taking, can take another; no more than TID  Advised to not take >10 per month  - since new onset, advised pt to keep a log, track anything associated with onset of headache and to avoid those triggers  - in particular, advised pt to keep log to monitor for any aura-like symptoms  Pt is currently on Sprintec and informed pt that if pt starts experiencing auras, will have to switch to non-estrogen contraceptives     - follow-up in 1 month

## 2022-09-02 NOTE — ASSESSMENT & PLAN NOTE
- has not been able to continue therapy due to out-of-pocket costs (uninsured)  Last had therapy 1 month ago   PHQ-9 today score of 2    - given new recent stressors and lack of insurance, referred to    - will follow-up in 1 month

## 2022-09-02 NOTE — PROGRESS NOTES
Assessment/Plan:    Intractable migraine without aura and without status migrainosus  - new onset 1+ month of recurrent unilateral headaches that are intractable with tylenol or excedrin usage; associated with onset of several life stressors (work, relationships, financially)   - start imitrex 25mg PRN  Advised that if headache not relieved within 30minutes of taking, can take another; no more than TID  Advised to not take >10 per month  - since new onset, advised pt to keep a log, track anything associated with onset of headache and to avoid those triggers  - in particular, advised pt to keep log to monitor for any aura-like symptoms  Pt is currently on Sprintec and informed pt that if pt starts experiencing auras, will have to switch to non-estrogen contraceptives  - follow-up in 1 month       Recurrent major depressive disorder, in partial remission ([facility] 75 )  - has not been able to continue therapy due to out-of-pocket costs (uninsured)  Last had therapy 1 month ago  PHQ-9 today score of 2    - given new recent stressors and lack of insurance, referred to    - will follow-up in 1 month    Neck pain  - chronic neck and shoulder pain since neck sprain from cheerleading accident ~10 years ago (person fell on pt head)  Pain has been exacerbated by new headache onset  Given that spurling test is negative, unlikely to have associated with any radiculopathy  Mostly muscle spasms/tightness  - rx lidocaine patch; advised to use as needed and not to keep it on for 12hr+   - will follow-up in 1 month       Diagnoses and all orders for this visit:    Intractable migraine without aura and without status migrainosus  -     SUMAtriptan (Imitrex) 25 mg tablet; Take 1 tablet (25 mg total) by mouth once as needed for migraine may take another tablet after 2 hours if migraine persists  Max 200 mg/24 hours      Recurrent major depressive disorder, in partial remission ([facility] )  -     Ambulatory Referral to Behavioral Health Therapists; Future  -     Ambulatory Referral to Social Work Care Management Program; Future    Uninsured  -     Ambulatory Referral to Social Work Care Management Program; Future    Neck pain  -     lidocaine (Lidoderm) 5 %; Apply 1 patch topically daily Remove & Discard patch within 12 hours or as directed by MD          Subjective:      Patient ID: To Anthony is a 19 y o  female w/PMHx of recurrent MDD, PTSD who presents today  for  Headaches that started about 1+ month ago  Reports about 3 times per week, lasts the entire day  Has tried taking tylenol and excedrin which only mildly helps with pain relief  Pt points to unilateral pain in back and around eyes as well as occipital region that often is associated with neck/shoulder pain that then radiates down the arms  Also associated with numbness sensation running down anterior arm up to 3rd and 4th digits  Numbness sensation comes and goes  Noticed that headaches in the last week associated with unilateral flashing lights/"tv static" vision during the start of the headache (not preceding headache)  Photophobia but denies any N/V  Sprained neck from cheerleading about 10 years ago (person fell on her head from 10ft in air)  Has had neck and shoulder pain since accident but denies ever having above set of symptoms before  The first episode with these set of symptoms was day after her HPV vaccine  Pt went to ED (see note from 6/13/2021)  Pt notes that a few months ago parents moved out and about a month ago her relationship "started not working out" and has been having other stressors related to work  Pt last saw a therapist about a month ago  Has not been going back due to out-of-pocket cost      Migraine  This is a new problem  The current episode started more than 1 month ago  The problem occurs intermittently (about 3 times per week, lasts entire day)  The problem is unchanged   The pain is present in the occipital and retro-orbital (unilateral but could be right or left side)  The pain does not radiate  The quality of the pain is described as aching  Associated symptoms include neck pain (worsens chronic neck pain), photophobia, tingling (down arm) and a visual change  Pertinent negatives include no abdominal pain, blurred vision, coughing, diarrhea, dizziness, ear pain, eye pain, eye redness, fever, hearing loss, nausea, phonophobia, rhinorrhea, sinus pressure, tinnitus, vomiting or weakness  The symptoms are aggravated by unknown  Past treatments include acetaminophen and Excedrin  The treatment provided mild relief  Her past medical history is significant for migraines in the family  There is no history of intracranial lesions, migraine headaches, obesity, recent head traumas (10 years ago neck sprain), a seizure disorder or sinus disease  The following portions of the patient's history were reviewed and updated as appropriate: allergies, current medications, past family history, past medical history, past social history, past surgical history and problem list     Review of Systems   Constitutional: Negative for activity change, appetite change, chills, fatigue, fever and unexpected weight change  HENT: Negative for congestion, ear pain, hearing loss, rhinorrhea, sinus pressure, sinus pain and tinnitus  Eyes: Positive for photophobia  Negative for blurred vision, pain and redness  Respiratory: Negative for cough, shortness of breath, wheezing and stridor  Cardiovascular: Negative for chest pain and palpitations  Gastrointestinal: Negative for abdominal distention, abdominal pain, blood in stool, constipation, diarrhea, nausea and vomiting  Musculoskeletal: Positive for neck pain (worsens chronic neck pain)  Skin: Negative for rash  Neurological: Positive for tingling (down arm) and headaches (unilateral, around eyes and occiput)   Negative for dizziness, syncope, facial asymmetry, speech difficulty, weakness and light-headedness  Psychiatric/Behavioral: Negative for agitation, confusion, decreased concentration, dysphoric mood, hallucinations, self-injury, sleep disturbance and suicidal ideas  The patient is not nervous/anxious  Feeling stressed         Objective:      /68 (BP Location: Left arm, Patient Position: Sitting, Cuff Size: Standard)   Pulse 86   Temp 98 7 °F (37 1 °C) (Temporal)   Wt 55 4 kg (122 lb 3 2 oz)   SpO2 98%   BMI 20 98 kg/m²          Physical Exam  Constitutional:       General: She is not in acute distress  Appearance: She is normal weight  She is not ill-appearing or diaphoretic  HENT:      Head: Normocephalic and atraumatic  Right Ear: External ear normal       Left Ear: External ear normal       Nose: No congestion  Mouth/Throat:      Mouth: Mucous membranes are moist       Pharynx: Oropharynx is clear  No oropharyngeal exudate or posterior oropharyngeal erythema  Eyes:      General: No scleral icterus  Right eye: No discharge  Left eye: No discharge  Extraocular Movements: Extraocular movements intact  Conjunctiva/sclera: Conjunctivae normal       Pupils: Pupils are equal, round, and reactive to light  Cardiovascular:      Rate and Rhythm: Normal rate and regular rhythm  Pulses: Normal pulses  Heart sounds: Normal heart sounds  No murmur heard  No friction rub  No gallop  Pulmonary:      Effort: Pulmonary effort is normal  No respiratory distress  Breath sounds: Normal breath sounds  No stridor  No wheezing, rhonchi or rales  Chest:      Chest wall: No tenderness  Abdominal:      General: Abdomen is flat  There is no distension  Palpations: Abdomen is soft  There is no mass  Tenderness: There is no abdominal tenderness  There is no guarding or rebound  Hernia: No hernia is present  Musculoskeletal:         General: No swelling, tenderness, deformity or signs of injury  Normal range of motion  Cervical back: Normal range of motion and neck supple  No rigidity or tenderness  Right lower leg: No edema  Left lower leg: No edema  Comments: spurling test negative bilaterally; mild tightness in bilateral cervical muscles and trapezius   Skin:     General: Skin is warm and dry  Coloration: Skin is not jaundiced or pale  Findings: No bruising, erythema, lesion or rash  Neurological:      General: No focal deficit present  Mental Status: She is alert and oriented to person, place, and time  Cranial Nerves: No cranial nerve deficit  Sensory: No sensory deficit  Motor: No weakness        Coordination: Coordination normal       Gait: Gait normal

## 2022-09-07 NOTE — TELEPHONE ENCOUNTER
Called Patient in regards to referral and placing patient on proper wait list  LVM for patient to contact intake department

## 2022-09-12 ENCOUNTER — PATIENT OUTREACH (OUTPATIENT)
Dept: FAMILY MEDICINE CLINIC | Facility: CLINIC | Age: 22
End: 2022-09-12

## 2022-09-12 ENCOUNTER — TELEPHONE (OUTPATIENT)
Dept: OBGYN CLINIC | Facility: CLINIC | Age: 22
End: 2022-09-12

## 2022-09-12 NOTE — PROGRESS NOTES
PABLO SANDOVAL received a referral from DO Scar to assist patient with lack of insurance and outpatient mental health  PABLO SANDOVAL outreached patient regarding same  PABLO SANDOVAL introduced self and explained role  Patient reported that she has applied for MA yesterday and is in the process of receiving paperwork from her employer to provide to her assigned   She also reported that she applied for SNAP benefits  Patient is aware that once she is potentially approved for MA insurance she will have more options to apply for outpatient mental health  Patient denied needing any further help at this time regarding food or transportation  She reported that she is working part-time and has been able to pay her rent due to her savings  She reported that she is getting low on her savings and may only be able to afford her apartment for one more month  PABLO SANDOVAL sent patient information on [employer]  Patient was receptive  Patient denied any further needs at this time  She consented to a follow up to discuss MA approval or denial and to provide assistance with outpatient mental health  PABLO SANDOVAL will continue to follow and provide psychosocial support as necessary

## 2022-09-12 NOTE — TELEPHONE ENCOUNTER
Pt LM on nurse line re: call from provider re: placebo pills for OCP  "I would like answers about my placebo pills  Can I take a week off, do I need to take the last day of placebo pills or can I go right into my next pack? Please call pt and/or advise  Provider informed RN pt does not need to take placebo pills if not desired but would start new OCP pack the first day after she would have completed the placebo pills  RN called back pt and informed pt per provider of plan  Pt had a verbal understanding and was comfortable with the plan

## 2022-09-14 NOTE — TELEPHONE ENCOUNTER
Called Patient in regards to routine referral and placing patient on proper wait list  LVMf or patient to contact intake department,

## 2022-09-27 ENCOUNTER — HOSPITAL ENCOUNTER (EMERGENCY)
Facility: HOSPITAL | Age: 22
End: 2022-09-28
Attending: EMERGENCY MEDICINE

## 2022-09-27 ENCOUNTER — APPOINTMENT (EMERGENCY)
Dept: CT IMAGING | Facility: HOSPITAL | Age: 22
End: 2022-09-27

## 2022-09-27 DIAGNOSIS — I60.9 SUBARACHNOID HEMORRHAGE, NONTRAUMATIC (HCC): ICD-10-CM

## 2022-09-27 DIAGNOSIS — R51.9 ACUTE NONINTRACTABLE HEADACHE, UNSPECIFIED HEADACHE TYPE: Primary | ICD-10-CM

## 2022-09-27 PROCEDURE — 70450 CT HEAD/BRAIN W/O DYE: CPT

## 2022-09-27 PROCEDURE — 99285 EMERGENCY DEPT VISIT HI MDM: CPT

## 2022-09-27 PROCEDURE — G1004 CDSM NDSC: HCPCS

## 2022-09-27 PROCEDURE — 99284 EMERGENCY DEPT VISIT MOD MDM: CPT | Performed by: EMERGENCY MEDICINE

## 2022-09-27 RX ORDER — ACETAMINOPHEN 325 MG/1
650 TABLET ORAL ONCE
Status: COMPLETED | OUTPATIENT
Start: 2022-09-27 | End: 2022-09-27

## 2022-09-27 RX ADMIN — ACETAMINOPHEN 650 MG: 325 TABLET ORAL at 21:58

## 2022-09-28 ENCOUNTER — APPOINTMENT (OUTPATIENT)
Dept: RADIOLOGY | Facility: HOSPITAL | Age: 22
End: 2022-09-28

## 2022-09-28 ENCOUNTER — HOSPITAL ENCOUNTER (INPATIENT)
Facility: HOSPITAL | Age: 22
LOS: 1 days | Discharge: HOME | End: 2022-09-29
Attending: INTERNAL MEDICINE | Admitting: PSYCHIATRY & NEUROLOGY

## 2022-09-28 ENCOUNTER — APPOINTMENT (EMERGENCY)
Dept: CT IMAGING | Facility: HOSPITAL | Age: 22
End: 2022-09-28

## 2022-09-28 VITALS
TEMPERATURE: 98.1 F | OXYGEN SATURATION: 99 % | SYSTOLIC BLOOD PRESSURE: 124 MMHG | RESPIRATION RATE: 16 BRPM | DIASTOLIC BLOOD PRESSURE: 71 MMHG | HEART RATE: 78 BPM

## 2022-09-28 DIAGNOSIS — I60.9 SAH (SUBARACHNOID HEMORRHAGE) (HCC): Primary | ICD-10-CM

## 2022-09-28 DIAGNOSIS — G43.019 INTRACTABLE MIGRAINE WITHOUT AURA AND WITHOUT STATUS MIGRAINOSUS: ICD-10-CM

## 2022-09-28 PROBLEM — R51.9 HEADACHE: Status: ACTIVE | Noted: 2022-09-28

## 2022-09-28 LAB
ALBUMIN SERPL BCP-MCNC: 4.4 G/DL (ref 3.5–5)
ALP SERPL-CCNC: 31 U/L (ref 34–104)
ALT SERPL W P-5'-P-CCNC: 18 U/L (ref 7–52)
ANION GAP SERPL CALCULATED.3IONS-SCNC: 6 MMOL/L (ref 4–13)
ANION GAP SERPL CALCULATED.3IONS-SCNC: 8 MMOL/L (ref 4–13)
APTT PPP: 23 SECONDS (ref 23–37)
AST SERPL W P-5'-P-CCNC: 17 U/L (ref 13–39)
BASOPHILS # BLD AUTO: 0.01 THOUSANDS/ΜL (ref 0–0.1)
BASOPHILS # BLD AUTO: 0.05 THOUSANDS/ΜL (ref 0–0.1)
BASOPHILS NFR BLD AUTO: 0 % (ref 0–1)
BASOPHILS NFR BLD AUTO: 1 % (ref 0–1)
BILIRUB SERPL-MCNC: 0.34 MG/DL (ref 0.2–1)
BUN SERPL-MCNC: 11 MG/DL (ref 5–25)
BUN SERPL-MCNC: 13 MG/DL (ref 5–25)
CALCIUM SERPL-MCNC: 9.4 MG/DL (ref 8.3–10.1)
CALCIUM SERPL-MCNC: 9.8 MG/DL (ref 8.4–10.2)
CHLORIDE SERPL-SCNC: 101 MMOL/L (ref 96–108)
CHLORIDE SERPL-SCNC: 107 MMOL/L (ref 96–108)
CO2 SERPL-SCNC: 24 MMOL/L (ref 21–32)
CO2 SERPL-SCNC: 25 MMOL/L (ref 21–32)
CREAT SERPL-MCNC: 0.76 MG/DL (ref 0.6–1.3)
CREAT SERPL-MCNC: 0.83 MG/DL (ref 0.6–1.3)
EOSINOPHIL # BLD AUTO: 0 THOUSAND/ΜL (ref 0–0.61)
EOSINOPHIL # BLD AUTO: 0.36 THOUSAND/ΜL (ref 0–0.61)
EOSINOPHIL NFR BLD AUTO: 0 % (ref 0–6)
EOSINOPHIL NFR BLD AUTO: 4 % (ref 0–6)
ERYTHROCYTE [DISTWIDTH] IN BLOOD BY AUTOMATED COUNT: 12.4 % (ref 11.6–15.1)
ERYTHROCYTE [DISTWIDTH] IN BLOOD BY AUTOMATED COUNT: 12.4 % (ref 11.6–15.1)
GFR SERPL CREATININE-BSD FRML MDRD: 110 ML/MIN/1.73SQ M
GFR SERPL CREATININE-BSD FRML MDRD: 99 ML/MIN/1.73SQ M
GLUCOSE SERPL-MCNC: 129 MG/DL (ref 65–140)
GLUCOSE SERPL-MCNC: 83 MG/DL (ref 65–140)
HCT VFR BLD AUTO: 40.9 % (ref 34.8–46.1)
HCT VFR BLD AUTO: 43.6 % (ref 34.8–46.1)
HGB BLD-MCNC: 13.5 G/DL (ref 11.5–15.4)
HGB BLD-MCNC: 14.4 G/DL (ref 11.5–15.4)
IMM GRANULOCYTES # BLD AUTO: 0.01 THOUSAND/UL (ref 0–0.2)
IMM GRANULOCYTES # BLD AUTO: 0.03 THOUSAND/UL (ref 0–0.2)
IMM GRANULOCYTES NFR BLD AUTO: 0 % (ref 0–2)
IMM GRANULOCYTES NFR BLD AUTO: 0 % (ref 0–2)
INR PPP: 0.98 (ref 0.84–1.19)
LYMPHOCYTES # BLD AUTO: 0.59 THOUSANDS/ΜL (ref 0.6–4.47)
LYMPHOCYTES # BLD AUTO: 3.32 THOUSANDS/ΜL (ref 0.6–4.47)
LYMPHOCYTES NFR BLD AUTO: 10 % (ref 14–44)
LYMPHOCYTES NFR BLD AUTO: 33 % (ref 14–44)
MAGNESIUM SERPL-MCNC: 2.2 MG/DL (ref 1.6–2.6)
MCH RBC QN AUTO: 28.5 PG (ref 26.8–34.3)
MCH RBC QN AUTO: 28.8 PG (ref 26.8–34.3)
MCHC RBC AUTO-ENTMCNC: 33 G/DL (ref 31.4–37.4)
MCHC RBC AUTO-ENTMCNC: 33 G/DL (ref 31.4–37.4)
MCV RBC AUTO: 86 FL (ref 82–98)
MCV RBC AUTO: 87 FL (ref 82–98)
MONOCYTES # BLD AUTO: 0.04 THOUSAND/ΜL (ref 0.17–1.22)
MONOCYTES # BLD AUTO: 0.42 THOUSAND/ΜL (ref 0.17–1.22)
MONOCYTES NFR BLD AUTO: 1 % (ref 4–12)
MONOCYTES NFR BLD AUTO: 4 % (ref 4–12)
NEUTROPHILS # BLD AUTO: 5.5 THOUSANDS/ΜL (ref 1.85–7.62)
NEUTROPHILS # BLD AUTO: 5.83 THOUSANDS/ΜL (ref 1.85–7.62)
NEUTS SEG NFR BLD AUTO: 58 % (ref 43–75)
NEUTS SEG NFR BLD AUTO: 89 % (ref 43–75)
NRBC BLD AUTO-RTO: 0 /100 WBCS
NRBC BLD AUTO-RTO: 0 /100 WBCS
PHOSPHATE SERPL-MCNC: 3.6 MG/DL (ref 2.7–4.5)
PLATELET # BLD AUTO: 229 THOUSANDS/UL (ref 149–390)
PLATELET # BLD AUTO: 256 THOUSANDS/UL (ref 149–390)
PMV BLD AUTO: 9.2 FL (ref 8.9–12.7)
PMV BLD AUTO: 9.3 FL (ref 8.9–12.7)
POTASSIUM SERPL-SCNC: 3.5 MMOL/L (ref 3.5–5.3)
POTASSIUM SERPL-SCNC: 4 MMOL/L (ref 3.5–5.3)
PROT SERPL-MCNC: 7.5 G/DL (ref 6.4–8.4)
PROTHROMBIN TIME: 13.3 SECONDS (ref 11.6–14.5)
RBC # BLD AUTO: 4.68 MILLION/UL (ref 3.81–5.12)
RBC # BLD AUTO: 5.06 MILLION/UL (ref 3.81–5.12)
SODIUM SERPL-SCNC: 132 MMOL/L (ref 135–147)
SODIUM SERPL-SCNC: 139 MMOL/L (ref 135–147)
WBC # BLD AUTO: 10.01 THOUSAND/UL (ref 4.31–10.16)
WBC # BLD AUTO: 6.15 THOUSAND/UL (ref 4.31–10.16)

## 2022-09-28 PROCEDURE — 80048 BASIC METABOLIC PNL TOTAL CA: CPT | Performed by: PSYCHIATRY & NEUROLOGY

## 2022-09-28 PROCEDURE — 85025 COMPLETE CBC W/AUTO DIFF WBC: CPT | Performed by: EMERGENCY MEDICINE

## 2022-09-28 PROCEDURE — 36415 COLL VENOUS BLD VENIPUNCTURE: CPT | Performed by: EMERGENCY MEDICINE

## 2022-09-28 PROCEDURE — 85610 PROTHROMBIN TIME: CPT | Performed by: EMERGENCY MEDICINE

## 2022-09-28 PROCEDURE — 99254 IP/OBS CNSLTJ NEW/EST MOD 60: CPT | Performed by: PHYSICIAN ASSISTANT

## 2022-09-28 PROCEDURE — 83735 ASSAY OF MAGNESIUM: CPT | Performed by: PSYCHIATRY & NEUROLOGY

## 2022-09-28 PROCEDURE — A9585 GADOBUTROL INJECTION: HCPCS | Performed by: PSYCHIATRY & NEUROLOGY

## 2022-09-28 PROCEDURE — 70553 MRI BRAIN STEM W/O & W/DYE: CPT

## 2022-09-28 PROCEDURE — 99254 IP/OBS CNSLTJ NEW/EST MOD 60: CPT | Performed by: PSYCHIATRY & NEUROLOGY

## 2022-09-28 PROCEDURE — 85025 COMPLETE CBC W/AUTO DIFF WBC: CPT | Performed by: PSYCHIATRY & NEUROLOGY

## 2022-09-28 PROCEDURE — 85730 THROMBOPLASTIN TIME PARTIAL: CPT | Performed by: EMERGENCY MEDICINE

## 2022-09-28 PROCEDURE — 70496 CT ANGIOGRAPHY HEAD: CPT

## 2022-09-28 PROCEDURE — G1004 CDSM NDSC: HCPCS

## 2022-09-28 PROCEDURE — 99223 1ST HOSP IP/OBS HIGH 75: CPT | Performed by: PSYCHIATRY & NEUROLOGY

## 2022-09-28 PROCEDURE — 96374 THER/PROPH/DIAG INJ IV PUSH: CPT

## 2022-09-28 PROCEDURE — 84100 ASSAY OF PHOSPHORUS: CPT | Performed by: PSYCHIATRY & NEUROLOGY

## 2022-09-28 PROCEDURE — 80053 COMPREHEN METABOLIC PANEL: CPT | Performed by: EMERGENCY MEDICINE

## 2022-09-28 PROCEDURE — 70498 CT ANGIOGRAPHY NECK: CPT

## 2022-09-28 PROCEDURE — NC001 PR NO CHARGE: Performed by: NEUROLOGICAL SURGERY

## 2022-09-28 RX ORDER — LANOLIN ALCOHOL/MO/W.PET/CERES
3 CREAM (GRAM) TOPICAL
Status: DISCONTINUED | OUTPATIENT
Start: 2022-09-28 | End: 2022-09-29 | Stop reason: HOSPADM

## 2022-09-28 RX ORDER — NIMODIPINE 30 MG/1
60 CAPSULE, LIQUID FILLED ORAL
Status: CANCELLED | OUTPATIENT
Start: 2022-09-28 | End: 2022-10-19

## 2022-09-28 RX ORDER — DIPHENHYDRAMINE HYDROCHLORIDE 50 MG/ML
INJECTION INTRAMUSCULAR; INTRAVENOUS
Status: DISCONTINUED
Start: 2022-09-28 | End: 2022-09-28 | Stop reason: HOSPADM

## 2022-09-28 RX ORDER — LORAZEPAM 2 MG/ML
0.5 INJECTION INTRAMUSCULAR ONCE
Status: DISCONTINUED | OUTPATIENT
Start: 2022-09-28 | End: 2022-09-28

## 2022-09-28 RX ORDER — ONDANSETRON 2 MG/ML
4 INJECTION INTRAMUSCULAR; INTRAVENOUS ONCE
Status: COMPLETED | OUTPATIENT
Start: 2022-09-28 | End: 2022-09-28

## 2022-09-28 RX ORDER — METHYLPREDNISOLONE SODIUM SUCCINATE 125 MG/2ML
INJECTION, POWDER, LYOPHILIZED, FOR SOLUTION INTRAMUSCULAR; INTRAVENOUS
Status: DISCONTINUED
Start: 2022-09-28 | End: 2022-09-28 | Stop reason: HOSPADM

## 2022-09-28 RX ORDER — LORAZEPAM 2 MG/ML
1 INJECTION INTRAMUSCULAR ONCE
Status: COMPLETED | OUTPATIENT
Start: 2022-09-28 | End: 2022-09-28

## 2022-09-28 RX ORDER — ACETAMINOPHEN 325 MG/1
650 TABLET ORAL EVERY 6 HOURS PRN
Status: DISCONTINUED | OUTPATIENT
Start: 2022-09-28 | End: 2022-09-29 | Stop reason: HOSPADM

## 2022-09-28 RX ADMIN — ACETAMINOPHEN 650 MG: 325 TABLET ORAL at 17:28

## 2022-09-28 RX ADMIN — ONDANSETRON 4 MG: 2 INJECTION INTRAMUSCULAR; INTRAVENOUS at 00:07

## 2022-09-28 RX ADMIN — GADOBUTROL 4.5 ML: 604.72 INJECTION INTRAVENOUS at 20:17

## 2022-09-28 RX ADMIN — LORAZEPAM 1 MG: 2 INJECTION INTRAMUSCULAR; INTRAVENOUS at 19:26

## 2022-09-28 RX ADMIN — Medication 3 MG: at 22:14

## 2022-09-28 RX ADMIN — IOHEXOL 100 ML: 350 INJECTION, SOLUTION INTRAVENOUS at 00:52

## 2022-09-28 NOTE — PROGRESS NOTES
Patient report has been given to [facility], RN from the [address] [place] [address] 7 Nursing unit  The patient will be transferred to Room 722

## 2022-09-28 NOTE — CONSULTS
[place] 2000, 19 y o  female MRN: [id number]  Unit/Bed#: ICU 08 Encounter: [id number]  Primary Care Provider: Mandi Rivera MD   Date and time admitted to hospital: 9/28/2022  8:21 AM    Inpatient consult to Neurosurgery  Consult performed by: Janet Coombs PA-C  Consult ordered by: Ema Donahue MD      consult completed on 9/28/2022 at 1300    SAH (subarachnoid hemorrhage) [facility])  Assessment & Plan  Presents as a transfer from [facility] with right temporal horn and bilateral frontal hyperdensities concerning for subarachnoid hemorrhage  · Presented with transient blurry vision, slurred speech, right-sided facial numbness, right arm numbness, left-sided headache which has since resolved  · History of migraines (unilateral head pain with associated contralateral numbness of face and arm) x a few months after second gardasil shot    Imaging reviewed personally and with attending, results are as follows:   CT head without contrast 09/27/2022: Right temporal horn in bilateral frontal hyperdensity concerning for subarachnoid hemorrhage although this may be artifactual   Recommend MRI for further evaluation  No hydrocephalus  Plan:   Continue to monitor neurological exam, currently GCS 15 and nonfocal    Imaging results reviewed with patient, demonstrates possible subarachnoid hemorrhage versus artifact  Unclear etiology of subarachnoid hemorrhages patient denies any trauma and there is no evidence of vascular etiology on CTA  o If blood noted on MRI, consider formal angio  o If no blood noted, query complex migraines   MRI brain ordered and pending for further evaluation   Medical management and pain control per primary team   DVT ppx:  SCDs, hold pharm dvt ppx until MRI is completed   Mobilize as tolerated with assistance, PT / OT evaluation    Neurosurgery will continue to follow pending MRI results    Please call with questions or concerns  * Intractable migraine without aura and without status migrainosus  Assessment & Plan  · Patient states she started with migraines a few months ago after getting her 2nd Gardasil shot  · Reports about 3-4 episodes since shot; characterized as unilateral head pain with contralateral face and arm numbness  · Did seek medical attention for these episodes but was deemed migraines which she has never had before  · Recommend neurology consult      History of Present Illness   HPI: To Anthony is a 19y o  year old female with PMHx significant for newly diagnosed migraines, anxiety, depression, PTSD who presents  As a transfer from [facility] with concerns for subarachnoid hemorrhage  Patient states yesterday she was going to visit her brother when she developed acute onset blurry vision, slurred speech/difficulty understanding her speech and numbness around the right side of her lip as well as right arm  She states this lasted no more than 10 minutes  She went to the hospital for evaluation and developed a left-sided headache, dull and pressure sensation which has since resolved  Since that episode patient has resolution of all of her symptoms  She reports a few months ago she was diagnosed with migraines after getting the 2nd Gardasil shot, stating her 1st episode of migraines was the day after the shot, prior to this she has never had migraines like this  She states her migraines are typically unilateral headaches with contralateral numbness in the face and arm  She states she has never had blurry vision or slurred speech with these episodes which is why she sought out medical attention  Previously she was  Sought out medical attention when she started with migraines and was diagnosed ultimately with migraines but she is unsure why this started all of a sudden  Patient is a fairly healthy individual   She works at [email or web address] and lives alone    She smokes marijuana a couple times a week  She drinks socially  She does not smoke  She has good blood pressure and cholesterol control  She has no family history of sudden unexplained death or aneurysm rupture  Review of Systems   Constitutional: Negative for activity change, chills and fever  HENT: Negative for hearing loss, tinnitus and trouble swallowing  Eyes: Negative for visual disturbance (resolved)  Respiratory: Negative for chest tightness and shortness of breath  Cardiovascular: Negative for chest pain  Gastrointestinal: Negative for abdominal pain, constipation, diarrhea, nausea and vomiting  Genitourinary: Negative for difficulty urinating  Musculoskeletal: Negative for back pain and neck pain  Skin: Negative for wound  Allergic/Immunologic: Negative for environmental allergies and food allergies  Neurological: Negative for dizziness, facial asymmetry, speech difficulty (resolved), weakness, numbness (resolved) and headaches (resolved)  Hematological: Does not bruise/bleed easily  Psychiatric/Behavioral: Negative for confusion         Historical Information   Past Medical History:   Diagnosis Date    Anxiety     Depression     Intractable migraine without aura and without status migrainosus 9/2/2022    PTSD (post-traumatic stress disorder)      Past Surgical History:   Procedure Laterality Date    KNEE SURGERY  2016    TONSILLECTOMY  2017     Social History     Substance and Sexual Activity   Alcohol Use Not Currently    Comment: Occ     Social History     Substance and Sexual Activity   Drug Use Yes    Types: Marijuana     Social History     Tobacco Use   Smoking Status Never Smoker   Smokeless Tobacco Never Used     Family History   Problem Relation Age of Onset    No Known Problems Mother     Hypertension Father     No Known Problems Brother     Colon cancer Maternal Grandmother     Lung cancer Maternal Grandfather     No Known Problems Paternal Grandmother     Diabetes Paternal Grandfather     Breast cancer Neg Hx     Ovarian cancer Neg Hx        Meds/Allergies   all current active meds have been reviewed, current meds:   Current Facility-Administered Medications   Medication Dose Route Frequency    [MAR Hold] LORazepam (ATIVAN) injection 0 5 mg  0 5 mg Intravenous Once    and PTA meds:   Prior to Admission Medications   Prescriptions Last Dose Informant Patient Reported? Taking? SUMAtriptan (Imitrex) 25 mg tablet   No No   Sig: Take 1 tablet (25 mg total) by mouth once as needed for migraine may take another tablet after 2 hours if migraine persists  Max 200 mg/24 hours  hydrOXYzine HCL (ATARAX) 25 mg tablet More than a month at Unknown time  No No   Sig: Take 1 tablet (25 mg total) by mouth every 6 (six) hours as needed for allergies or anxiety   lidocaine (Lidoderm) 5 %   No No   Sig: Apply 1 patch topically daily Remove & Discard patch within 12 hours or as directed by MD   norgestimate-ethinyl estradiol (Sprintec 28) 0 25-35 MG-MCG per tablet   No No   Sig: Take 1 tablet by mouth daily      Facility-Administered Medications: None     Allergies   Allergen Reactions    Shellfish-Derived Products - Food Allergy Hives     Varying degress of severity, unsure of mechanism    Iv Contrast [Iodinated Diagnostic Agents] Hives       Objective   I/O       09/26 0701 09/27 0700 09/27 0701 09/28 0700 09/28 0701 09/29 0700    Urine (mL/kg/hr)   250    Total Output   250    Net   -250                 Physical Exam  Constitutional:       General: She is awake  Appearance: Normal appearance  HENT:      Head: Normocephalic and atraumatic  Eyes:      Extraocular Movements: Extraocular movements intact and EOM normal       Conjunctiva/sclera: Conjunctivae normal    Cardiovascular:      Rate and Rhythm: Normal rate  Pulmonary:      Effort: Pulmonary effort is normal  No respiratory distress  Skin:     General: Skin is warm and dry     Neurological:      Mental Status: She is alert and oriented to person, place, and time  Coordination: Finger-Nose-Finger Test normal       Deep Tendon Reflexes: Strength normal    Psychiatric:         Attention and Perception: Attention and perception normal          Mood and Affect: Mood and affect normal          Speech: Speech normal          Behavior: Behavior normal  Behavior is cooperative  Thought Content: Thought content normal          Cognition and Memory: Cognition and memory normal          Judgment: Judgment normal        Neurologic Exam     Mental Status   Oriented to person, place, and time  Follows 1 step commands  Attention: normal  Concentration: normal    Speech: speech is normal   Level of consciousness: alert  Knowledge: good  Able to perform simple calculations  Normal comprehension  Cranial Nerves     CN III, IV, VI   Extraocular motions are normal    CN III: no CN III palsy  CN VI: no CN VI palsy  Nystagmus: none   Diplopia: none  Ophthalmoparesis: none  Upgaze: normal  Downgaze: normal  Conjugate gaze: present    CN V   Right facial sensation deficit: none  Left facial sensation deficit: none    CN VII   Right facial weakness: none  Left facial weakness: none    CN VIII   Hearing: intact    CN IX, X   CN IX normal    CN X normal      CN XI   Right trapezius strength: normal  Left trapezius strength: normal    CN XII   CN XII normal      Motor Exam   Muscle bulk: normal  Overall muscle tone: normal  Right arm pronator drift: absent  Left arm pronator drift: absent    Strength   Strength 5/5 throughout       Sensory Exam   Light touch normal      Gait, Coordination, and Reflexes     Coordination   Finger to nose coordination: normal    Tremor   Resting tremor: absent  Intention tremor: absent  Action tremor: absent    Reflexes   Right : 2+  Left : 2+  Right Mccullough: absent  Left Mccullough: absent  Right ankle clonus: absent  Left ankle clonus: absent        Vitals:Blood pressure 99/57, pulse 66, temperature 97 7 °F (36 5 °C), temperature source Oral, resp  rate 18, height 5' 4" (1 626 m), weight 43 5 kg (95 lb 14 4 oz), last menstrual period 09/11/2022, SpO2 97 %  ,Body mass index is 16 46 kg/m²  Lab Results:   Results from last 7 days   Lab Units 09/28/22  0948 09/28/22  0001   WBC Thousand/uL 6 15 10 01   HEMOGLOBIN g/dL 13 5 14 4   HEMATOCRIT % 40 9 43 6   PLATELETS Thousands/uL 229 256   NEUTROS PCT % 89* 58   MONOS PCT % 1* 4     Results from last 7 days   Lab Units 09/28/22  0948 09/28/22  0001   POTASSIUM mmol/L 4 0 3 5   CHLORIDE mmol/L 107 101   CO2 mmol/L 24 25   BUN mg/dL 11 13   CREATININE mg/dL 0 83 0 76   CALCIUM mg/dL 9 4 9 8   ALK PHOS U/L  --  31*   ALT U/L  --  18   AST U/L  --  17     Results from last 7 days   Lab Units 09/28/22  0948   MAGNESIUM mg/dL 2 2     Results from last 7 days   Lab Units 09/28/22  0948   PHOSPHORUS mg/dL 3 6     Results from last 7 days   Lab Units 09/28/22  0001   INR  0 98   PTT seconds 23       Imaging Studies: I have personally reviewed pertinent reports  and I have personally reviewed pertinent films in PACS    CTA head and neck with and without contrast    Result Date: 9/28/2022  Impression: Unremarkable CT angiogram of the head and neck  Workstation performed: [id number]     EKG, Pathology, and Other Studies: I have personally reviewed pertinent reports  VTE Prophylaxis: Sequential compression device (Venodyne)  and Reason for no pharmacologic prophylaxis - possible SAH    Code Status: Level 1 - Full Code  Advance Directive and Living Will:      Power of :    POLST:      Counseling / Coordination of Care  I spent 20 minutes with the patient

## 2022-09-28 NOTE — ASSESSMENT & PLAN NOTE
19year old female with migraines, presented to the ED for evaluation after experiencing 10 minutes of blurred vision and dysarthria, followed by a left sided pressure like headache that has since resolved  She also experienced numbness on the right side of her lips and right arm, which is common with her migraines, however the preceding symptoms were not typical for her  On arrival, blood pressure was 116/72  Other vitals were stable  [address] concerning for possible subarachnoid hemorrhage in the right temporal horn and bilateral frontal lobes  CTA unremarkable without source of bleeding  Patient was transferred to [facility] for further evaluation  MRI brain did not reveal any evidence of SAH  Findings on CTH determined to be artifact  Patient's presentation most likely a migraine with aura/complex migraine  Plan:  - Encourage adequate rest/good sleep hygiene and hydration  - Tylenol PRN   - Can take Riboflavin and magnesium oxide as an outpatient for prophylaxis   - An outpatient hospital follow up appointment has been requested with the neurology team  The office will call the patient to help set up an appointment  - Medical management and supportive care per primary team  Correction of any metabolic or infectious disturbances    - Patient encouraged to return to the hospital with any new onset severe headache, atypical of her migraines, or new stroke like symptoms atypical of her aura  Patient is stable for discharge from a neurologic standpoint

## 2022-09-28 NOTE — PROGRESS NOTES
Asked to review this patient's images and chart    Briefly this is a 19year old female with headaches and an especially severe one this evening  A CT of the head (below) demonstrates some subtle hyperdensity which could be subarachnoid blood  I see no abnormailty on a CTA but this does not change the noncon CT finding    Recommend:  T2* MRI sequence could be helpful to confirm or refute this  The possibility of beam hardening artifact could not be eliminated on repeat CT with altered gantry angle because the patient has received IV contrast for the CTA  Alternatively an LP could be performed to confirm but not to eliminate the possibility of a small SAH which is localized

## 2022-09-28 NOTE — EMTALA/ACUTE CARE TRANSFER
[facility] EMERGENCY DEPARTMENT  [address] [facility] [address]  Dept: [phone]      EMTALA TRANSFER CONSENT    NAME Samuel FLEIPE 2000                              MRN [id number]    I have been informed of my rights regarding examination, treatment, and transfer   by Dr Isaiah Church MD    Benefits: Specialized equipment and/or services available at the receiving facility (Include comment)________________________    Risks: Potential for delay in receiving treatment, Potential deterioration of medical condition      Transfer Request   I acknowledge that my medical condition has been evaluated and explained to me by the emergency department physician or other qualified medical person and/or my attending physician who has recommended and offered to me further medical examination and treatment  I understand the Hospital's obligation with respect to the treatment and stabilization of my emergency medical condition  I nevertheless request to be transferred  I release the Hospital, the doctor, and any other persons caring for me from all responsibility or liability for any injury or ill effects that may result from my transfer and agree to accept all responsibility for the consequences of my choice to transfer, rather than receive stabilizing treatment at the Hospital  I understand that because the transfer is my request, my insurance may not provide reimbursement for the services  The Hospital will assist and direct me and my family in how to make arrangements for transfer, but the hospital is not liable for any fees charged by the transport service  In spite of this understanding, I refuse to consent to further medical examination and treatment which has been offered to me, and request transfer to [address], [address] : [facility]   I authorize the performance of emergency medical procedures and treatments upon me in both transit and upon arrival at the receiving facility  Additionally, I authorize the release of any and all medical records to the receiving facility and request they be transported with me, if possible  I authorize the performance of emergency medical procedures and treatments upon me in both transit and upon arrival at the receiving facility  Additionally, I authorize the release of any and all medical records to the receiving facility and request they be transported with me, if possible  I understand that the safest mode of transportation during a medical emergency is an ambulance and that the Hospital advocates the use of this mode of transport  Risks of traveling to the receiving facility by car, including absence of medical control, life sustaining equipment, such as oxygen, and medical personnel has been explained to me and I fully understand them  (URBAN CORRECT BOX BELOW)  [  ]  I consent to the stated transfer and to be transported by ambulance/helicopter  [  ]  I consent to the stated transfer, but refuse transportation by ambulance and accept full responsibility for my transportation by car  I understand the risks of non-ambulance transfers and I exonerate the Hospital and its staff from any deterioration in my condition that results from this refusal     X___________________________________________    DATE  22  TIME________  Signature of patient or legally responsible individual signing on patient behalf           RELATIONSHIP TO PATIENT_________________________          Provider Certification    NAME Neeraj Camacho DOB 2000                              MRN [id number]    A medical screening exam was performed on the above named patient  Based on the examination:    Condition Necessitating Transfer The primary encounter diagnosis was Acute nonintractable headache, unspecified headache type   A diagnosis of Subarachnoid hemorrhage, nontraumatic [facility]) was also pertinent to this visit  Patient Condition: The patient has been stabilized such that within reasonable medical probability, no material deterioration of the patient condition or the condition of the unborn child(nayely) is likely to result from the transfer    Reason for Transfer: Level of Care needed not available at this facility    Transfer Requirements: [address]   · Space available and qualified personnel available for treatment as acknowledged by    · Agreed to accept transfer and to provide appropriate medical treatment as acknowledged by       [employer]  · Appropriate medical records of the examination and treatment of the patient are provided at the time of transfer   [address], [address] _______  · Transfer will be performed by qualified personnel from    and appropriate transfer equipment as required, including the use of necessary and appropriate life support measures  Provider Certification: I have examined the patient and explained the following risks and benefits of being transferred/refusing transfer to the patient/family:         Based on these reasonable risks and benefits to the patient and/or the unborn child(nayely), and based upon the information available at the time of the patients examination, I certify that the medical benefits reasonably to be expected from the provision of appropriate medical treatments at another medical facility outweigh the increasing risks, if any, to the individuals medical condition, and in the case of labor to the unborn child, from effecting the transfer      X____________________________________________ DATE 09/28/22        TIME_______      ORIGINAL - SEND TO MEDICAL RECORDS   COPY - SEND WITH PATIENT DURING TRANSFER

## 2022-09-28 NOTE — ASSESSMENT & PLAN NOTE
· Patient states she started with migraines a few months ago after getting her 2nd Gardasil shot  · Reports about 3-4 episodes since shot; characterized as unilateral head pain with contralateral face and arm numbness  · Did seek medical attention for these episodes but was deemed migraines which she has never had before  · Recommend neurology consult

## 2022-09-28 NOTE — LETTER
[address] [facility] [address]  [address] [facility]  [place] [address]  Dept: [phone]    September 29, 2022     Patient: Mirela Alexander   YOB: 2000   Date of Visit: 9/28/2022       To Whom it May Concern:    Mirela Alexander is under my professional care  She was seen in the hospital from 9/28/2022   to 09/29/22  She may return to work on 10/1/2022 without limitations  If you have any questions or concerns, please don't hesitate to call           Sincerely,          Shefali Webb, DO

## 2022-09-28 NOTE — H&P
H&P - [address] [address] 19 y o  female MRN: [id number]  Unit/Bed#: ICU 08 Encounter: [id number]        ----------------------------------------------------------------------------------------  HPI     20 yo female presenting to outside campus with headache and left facial sensation changes, on CTH was found to have possible SAH in the right temporal horn and bilateral frontal regions although differential is streak artifact  CTA was negative for vascular abnormality  She was transferred to [facility] ICU for further workup including MRI and neurosurgical evaluation if findings consistent with SAH  Has reported frequent headaches since June of this year shortly after receiving the Guardasil vaccine  Her headaches are migrainous and frontal/left with hemisensory changes  Vitals:  Temp:  [97 7 °F (36 5 °C)-98 1 °F (36 7 °C)] 97 7 °F (36 5 °C)  HR:  [74-86] 86  Resp:  [14-18] 15  BP: (108-131)/(58-78) 118/69  SpO2:  [98 %-100 %] 98 %    I&Os:  No intake/output data recorded      Labs:  Abnormal Labs Reviewed - No abnormal labs to display    ---------------------------------------------------------------------------------------  SUBJECTIVE  No acute complaints, feels back to normal no headache and no associated symptoms      Review of systems was reviewed and negative unless stated above in HPI/24-hour events   ---------------------------------------------------------------------------------------  Assessment and Plan:    Neuro / Psych:    Diagnosis: Possible bilateral frontal and right temporal SAH  - Plan:   MRI brain w wo contrast   Diagnosis: headaches  o No current headache  o Tylenol PRN for breakthrough      CV:    Diagnosis: No acute issues  o Hemodynamically stable  o Monitor vitals per unit routine      Pulm:   Diagnosis:  No acute issues  o Oxygen supplement PRN, goal O2Sat > 90%  o Current O2Sat 95-99% on RA      GI:    Diagnosis: No acute issues  o No need for PPX pt can tolerate PO meals   Diagnosis:  Stress ulcer PPX      Renal/:   Diagnosis: No acute issues  o Monitor I&Os      F/E/N:    Fluid: maintain euvolemic  o Currently NPO in case of intervention but otherwise able to tolerate PO liquids when appropriate   Electrolytes: replete PRN to maintain goal  o Goal Na 135-145, Mg > 2, Phos > 3, K goal > 4  o Daily BMP   Nutrition:  o NPO currently but normal diet when appropriate      Heme/Onc:    Diagnosis: No acute issues  o Monitor daily CBC    Endo:    Diagnosis: No acute issues  o BG goal <180      ID:    Diagnosis: No leukocytosis or fever  o Monitor off abx      MSK/Skin:    Diagnosis: pressure ulcer ppx  o Pt is ambulatory   Diagnosis: VTE ppx  o Pharmacologic: Pharmacologic VTE Prophylaxis contraindicated due to currently rule out for [facility]  o Mechanical: SCD's        Disposition: Admit to Critical Care   Code Status: Level 1 - Full Code  ---------------------------------------------------------------------------------------  ICU CORE MEASURES    Prophylaxis   VTE Pharmacologic Prophylaxis: SAH  VTE Mechanical Prophylaxis: sequential compression device  Stress Ulcer Prophylaxis: Prophylaxis Not Indicated     ABCDE Protocol (if indicated)  Plan to perform spontaneous awakening trial today? Not applicable  Plan to perform spontaneous breathing trial today? Not applicable  Obvious barriers to extubation? Not applicable  CAM-ICU: Negative    Invasive Devices Review  Invasive Devices  Report    Peripheral Intravenous Line  Duration           Peripheral IV 09/28/22 Left Arm <1 day              Can any invasive devices be discontinued today?  Not applicable  ---------------------------------------------------------------------------------------  OBJECTIVE    Vitals   Vitals:    09/28/22 0830   BP: 118/69   BP Location: Left arm   Pulse: 86   Resp: 15   Temp: 97 7 °F (36 5 °C)   TempSrc: Oral   SpO2: 98%   Weight: 43 5 kg (95 lb 14 4 oz)   Height: 5' 4" (1 626 m)     Temp (24hrs), Av 9 °F (36 6 °C), Min:97 7 °F (36 5 °C), Max:98 1 °F (36 7 °C)  Current: Temperature: 97 7 °F (36 5 °C)  HR: Pulse: 86 (22)  BP: Blood Pressure: 118/69 (22)  RR: Respirations: 15 (22)  SpO2: SpO2: 98 % (22)    Respiratory:  SpO2: SpO2: 98 %       Invasive/non-invasive ventilation settings   Respiratory  Report   Lab Data (Last 4 hours)    None         O2/Vent Data (Last 4 hours)    None                  Physical exam:  Physical Examination: General appearance - alert, well appearing, and in no distress  Eyes - pupils equal and reactive, extraocular eye movements intact  Mouth - mucous membranes moist, pharynx normal without lesions  Chest - clear to auscultation, no wheezes, rales or rhonchi, symmetric air entry  Heart - normal rate, regular rhythm, normal S1, S2, no murmurs, rubs, clicks or gallops  Abdomen - soft, nontender, nondistended, no masses or organomegaly  Musculoskeletal - no joint tenderness, deformity or swelling  Extremities - peripheral pulses normal, no pedal edema, no clubbing or cyanosis  Skin - normal coloration and turgor, no rashes, no suspicious skin lesions noted    Neuro exam  MS - AOx4 following complex commands, speech and language intact  CN - 2-12 intact  M - 5/5 in all 4 extremities  S - intact to LT throughout  C - FNF, OJ, HTS intact  Gait - normal  R - 2+ throughout toes down      Laboratory and Diagnostics:  Results from last 7 days   Lab Units 22  0001   WBC Thousand/uL 10 01   HEMOGLOBIN g/dL 14 4   HEMATOCRIT % 43 6   PLATELETS Thousands/uL 256   NEUTROS PCT % 58   MONOS PCT % 4     Results from last 7 days   Lab Units 22  0001   SODIUM mmol/L 132*   POTASSIUM mmol/L 3 5   CHLORIDE mmol/L 101   CO2 mmol/L 25   ANION GAP mmol/L 6   BUN mg/dL 13   CREATININE mg/dL 0 76   CALCIUM mg/dL 9 8   GLUCOSE RANDOM mg/dL 83   ALT U/L 18   AST U/L 17   ALK PHOS U/L 31*   ALBUMIN g/dL 4 4   TOTAL BILIRUBIN mg/dL 0 34 Results from last 7 days   Lab Units 09/28/22  0001   INR  0 98   PTT seconds 23              ABG:    VBG:          Micro        EKG: No recent EKG last 24hr     Imaging: CTH and CTA as reviewed above I have personally reviewed pertinent films in PACS    Intake and Output  I/O     None            Height and Weights   Height: 5' 4" (162 6 cm)     Body mass index is 16 46 kg/m²  Weight (last 2 days)     Date/Time Weight    09/28/22 0830 43 5 (95 9)            Nutrition       Diet Orders   (From admission, onward)             Start     Ordered    09/28/22 0838  Diet NPO  Diet effective now        References:    Nutrtion Support Algorithm Enteral vs  Parenteral   Question Answer Comment   Diet Type NPO    RD to adjust diet per protocol? Yes        09/28/22 0840                    Active Medications  Scheduled Meds:  Continuous Infusions:  No current facility-administered medications for this encounter  PRN Meds:       Allergies   Allergies   Allergen Reactions    Shellfish-Derived Products - Food Allergy Hives     Varying degress of severity, unsure of mechanism    Iv Contrast [Iodinated Diagnostic Agents] Hives     ---------------------------------------------------------------------------------------  Advance Directive and Living Will:      Power of :    POLST:    ---------------------------------------------------------------------------------------  Care Time Delivered:   No Critical Care time spent     Sun Morales MD      Portions of the record may have been created with voice recognition software  Occasional wrong word or "sound a like" substitutions may have occurred due to the inherent limitations of voice recognition software    Read the chart carefully and recognize, using context, where substitutions have occurred

## 2022-09-28 NOTE — ED NOTES
EMS arrived to transport pt report given to emt       Randy Kline  09/28/22 2352 Problem: Patient Care Overview (Adult)  Goal: Plan of Care Review  Outcome: Ongoing (interventions implemented as appropriate)   03/23/18 1857   Coping/Psychosocial Response Interventions   Plan Of Care Reviewed With patient;spouse   Patient Care Overview   Progress improving   Outcome Evaluation   Outcome Summary/Follow up Plan Patient tolerated ambulating through halls. ACHS. CT still in place. Pacemaker connected and pacing, 80, 10, 0.5.        Problem: Patient Care Overview  Goal: Interprofessional Rounds/Family Conf  Outcome: Ongoing (interventions implemented as appropriate)      Problem: Diabetes, Type 1 (Adult)  Goal: Signs and Symptoms of Listed Potential Problems Will be Absent, Minimized or Managed (Diabetes, Type 1)  Outcome: Ongoing (interventions implemented as appropriate)   03/21/18 2119 03/23/18 1857   Goal/Outcome Evaluation   Problems Assessed (Type 1 Diabetes) --  all   Problems Present (Type 1 Diabetes) situational response --        Problem: Nutrition, Imbalanced: Inadequate Oral Intake (Adult)  Goal: Improved Oral Intake  Outcome: Ongoing (interventions implemented as appropriate)   03/23/18 1857   Nutrition, Imbalanced: Inadequate Oral Intake (Adult)   Improved Oral Intake making progress toward outcome     Goal: Prevent Further Weight Loss  Outcome: Ongoing (interventions implemented as appropriate)   03/23/18 1857   Nutrition, Imbalanced: Inadequate Oral Intake (Adult)   Prevent Further Weight Loss making progress toward outcome

## 2022-09-28 NOTE — ASSESSMENT & PLAN NOTE
Presents as a transfer from [facility] with right temporal horn and bilateral frontal hyperdensities concerning for subarachnoid hemorrhage  · Presented with transient blurry vision, slurred speech, right-sided facial numbness, right arm numbness, left-sided headache which has since resolved  · History of migraines (unilateral head pain with associated contralateral numbness of face and arm) x a few months after second gardasil shot    Imaging reviewed personally and with attending, results are as follows:   CT head without contrast 09/27/2022: Right temporal horn in bilateral frontal hyperdensity concerning for subarachnoid hemorrhage although this may be artifactual   Recommend MRI for further evaluation  No hydrocephalus  Plan:   Continue to monitor neurological exam, currently GCS 15 and nonfocal    Imaging results reviewed with patient, demonstrates possible subarachnoid hemorrhage versus artifact  Unclear etiology of subarachnoid hemorrhages patient denies any trauma and there is no evidence of vascular etiology on CTA  o If blood noted on MRI, consider formal angio  o If no blood noted, query complex migraines   MRI brain ordered and pending for further evaluation   Medical management and pain control per primary team   DVT ppx:  SCDs, hold pharm dvt ppx until MRI is completed   Mobilize as tolerated with assistance, PT / OT evaluation    Neurosurgery will continue to follow pending MRI results  Please call with questions or concerns

## 2022-09-28 NOTE — ED NOTES
Info:    0030 w/ [facility]  Where: [facility] ICU  Accepting: Dr Kurt Arnold  Report: Maty rBar, RN  09/28/22 8241

## 2022-09-28 NOTE — ED PROVIDER NOTES
History  Chief Complaint   Patient presents with   Thalia Bales     Pt presents stating 1 hour ago experienced blurry vision, states she was speaking with her brother and noticed change in speech and felt numbness in right hand and lips  Symptoms have since resolved, 30 min ago     Patient is a 24-year-old female who presents to the emergency department with acute onset of a headache, blurry vision and nausea  Patient states that her symptoms began about an hour to 2 hours prior to arrival in the emergency department  She states that she had a mild throbbing 7/10 headache that was gradual in onset had no aggravating or alleviating factors  Patient initially prior history of migraine headaches for which she takes a Triptan  She also states that she noticed that her vision became blurry transiently for about 10 minutes  It resolved without intervention  She also states that she was talking to her brother felt like she was having some difficulty expressing herself  This also lasted for minutes and resolved without intervention  Patient has no prior history of CVA/TIA, diabetes, hypertension, tobacco use  History provided by:  Patient   used: No        Prior to Admission Medications   Prescriptions Last Dose Informant Patient Reported? Taking? SUMAtriptan (Imitrex) 25 mg tablet   No No   Sig: Take 1 tablet (25 mg total) by mouth once as needed for migraine may take another tablet after 2 hours if migraine persists  Max 200 mg/24 hours     hydrOXYzine HCL (ATARAX) 25 mg tablet   No No   Sig: Take 1 tablet (25 mg total) by mouth every 6 (six) hours as needed for allergies or anxiety   lidocaine (Lidoderm) 5 %   No No   Sig: Apply 1 patch topically daily Remove & Discard patch within 12 hours or as directed by MD   norgestimate-ethinyl estradiol (Sprintec 28) 0 25-35 MG-MCG per tablet   No No   Sig: Take 1 tablet by mouth daily      Facility-Administered Medications: None       Past Medical History:   Diagnosis Date    Anxiety     Depression     Intractable migraine without aura and without status migrainosus 9/2/2022    PTSD (post-traumatic stress disorder)        Past Surgical History:   Procedure Laterality Date    KNEE SURGERY  2016    TONSILLECTOMY  2017       Family History   Problem Relation Age of Onset    No Known Problems Mother     Hypertension Father     No Known Problems Brother     Colon cancer Maternal Grandmother     Lung cancer Maternal Grandfather     No Known Problems Paternal Grandmother     Diabetes Paternal Grandfather     Breast cancer Neg Hx     Ovarian cancer Neg Hx      I have reviewed and agree with the history as documented  E-Cigarette/Vaping    E-Cigarette Use Never User      E-Cigarette/Vaping Substances    Nicotine No     THC No     CBD No     Flavoring No     Other No     Unknown No      Social History     Tobacco Use    Smoking status: Never Smoker    Smokeless tobacco: Never Used   Vaping Use    Vaping Use: Never used   Substance Use Topics    Alcohol use: Not Currently     Comment: Occ    Drug use: Never       Review of Systems   Constitutional: Negative for fever  Eyes: Positive for visual disturbance  Respiratory: Negative for shortness of breath  Cardiovascular: Negative for chest pain  Gastrointestinal: Positive for nausea  Negative for vomiting  Musculoskeletal: Negative for back pain  Skin: Negative for color change and rash  Neurological: Positive for numbness and headaches  Negative for weakness  All other systems reviewed and are negative  Physical Exam  Physical Exam  Vitals and nursing note reviewed  Constitutional:       General: She is not in acute distress  Appearance: She is well-developed  HENT:      Head: Normocephalic and atraumatic        Mouth/Throat:      Mouth: Mucous membranes are moist    Eyes:      Conjunctiva/sclera: Conjunctivae normal    Cardiovascular:      Rate and Rhythm: Normal rate and regular rhythm  Heart sounds: No murmur heard  Pulmonary:      Effort: Pulmonary effort is normal  No respiratory distress  Breath sounds: Normal breath sounds  Abdominal:      Palpations: Abdomen is soft  Tenderness: There is no abdominal tenderness  Musculoskeletal:      Cervical back: Neck supple  Skin:     General: Skin is warm and dry  Neurological:      General: No focal deficit present  Mental Status: She is alert and oriented to person, place, and time  Mental status is at baseline  Cranial Nerves: No cranial nerve deficit  Sensory: No sensory deficit  Motor: No weakness  Coordination: Coordination normal       Gait: Gait normal       Deep Tendon Reflexes: Reflexes normal          Vital Signs  ED Triage Vitals   Temperature Pulse Respirations Blood Pressure SpO2   09/27/22 2138 09/27/22 2135 09/27/22 2135 09/27/22 2135 09/27/22 2135   98 1 °F (36 7 °C) 76 18 116/72 99 %      Temp Source Heart Rate Source Patient Position - Orthostatic VS BP Location FiO2 (%)   09/27/22 2138 09/27/22 2135 09/27/22 2135 09/27/22 2135 --   Oral Monitor Sitting Right arm       Pain Score       09/27/22 2158       5           Vitals:    09/27/22 2135   BP: 116/72   Pulse: 76   Patient Position - Orthostatic VS: Sitting         Visual Acuity      ED Medications  Medications   acetaminophen (TYLENOL) tablet 650 mg (650 mg Oral Given 9/27/22 2158)       Diagnostic Studies  Results Reviewed     None                 CT head without contrast    (Results Pending)              Procedures  Procedures         ED Course  ED Course as of 09/28/22 0543   Tue Sep 27, 2022   2344 Radiology called to inform me that patient may have an acute subarachnoid hemorrhage in the right temporal horn  Wed Sep 28, 2022   0029 Discussed case with critical care attending at [place]  Awaiting results of CTA of head and neck     4196 Patient develops some itching and a rash after IV contrast dye administration  She is given Benadryl and Solu-Medrol  Symptoms have improved she is resting comfortably  CTA results reviewed and related to critical care attending at [facility]  SBIRT 20yo+    Flowsheet Row Most Recent Value   SBIRT (25 yo +)    In order to provide better care to our patients, we are screening all of our patients for alcohol and drug use  Would it be okay to ask you these screening questions? No Filed at: 09/27/2022 2137                    MDM  Number of Diagnoses or Management Options     Amount and/or Complexity of Data Reviewed  Clinical lab tests: ordered and reviewed  Tests in the radiology section of CPT®: reviewed and ordered  Review and summarize past medical records: yes    Risk of Complications, Morbidity, and/or Mortality  Presenting problems: moderate  Diagnostic procedures: moderate  Management options: moderate  General comments: Visit 20-year-old female presented with acute onset of a headache and some vision changes  She reportedly has an acute some arachnoid hemorrhage on her CT scan and is being transferred to [facility] for intensive care and likely subspecialty evaluation  She is currently resting comfortably and showing no new signs neurological deterioration on her physical examination  Plan is to transfer her to outside hospital at 7:30 a m  this morning  Patient Progress  Patient progress: stable      Disposition  Final diagnoses:   None     ED Disposition     None      Follow-up Information    None         Patient's Medications   Discharge Prescriptions    No medications on file       No discharge procedures on file      PDMP Review     None          ED Provider  Electronically Signed by           Dallas Allen MD  09/28/22 [address], [place]  09/28/22 5954

## 2022-09-28 NOTE — ED NOTES
Report given to receiving nurse @ [facility] ICU prior to patient transport  Cipriano Valdez RN)     Orlin Kiran RN  09/28/22 0430

## 2022-09-28 NOTE — CONSULTS
Consultation - Neurology   Lia Gomez 19 y o  female MRN: [id number]  Unit/Bed#: [address] [facility] 722-01 Encounter: [id number]      Assessment/Plan     Headache  Assessment & Plan  19year old female with migraines, presented to the ED for evaluation after experiencing 10 minutes of blurred vision and dysarthria, followed by a left sided pressure like headache that has since resolved  She also experienced numbness on the right side of her lips and right arm, which is common with her migraines, however the preceding symptoms were not typical for her  On arrival, blood pressure was 116/72  Other vitals were stable  [address] concerning for possible subarachnoid hemorrhage in the right temporal horn and bilateral frontal lobes  CTA unremarkable without source of bleeding  Patient was transferred to [facility] for further evaluation  Awaiting further evaluation for possible SAH, which could be source of patient's headache and neurologic symptoms  If no SAH identified on MRI, presentation could be migraine aura followed by her typical complex migraine  Plan:  - MRI brain pending  - Neurosurgery following  · Awaiting MRI brain  · If evidence of hemorrhage, will then consider angiogram   · If no evidence of hemorrhage, suspecting complex migraines     - Abortive migraine recommendations:   · Could consider migraine cocktail using Solumedrol 500 mg IV BID, Benadryl 25 mg IV BID, and Reglan 10 mg IV BID  · Tylenol PRN   · Avoid sumatriptan at this time   · Encourage adequate rest/good sleep hygiene, hydration   - Can take Riboflavin and magnesium oxide as an outpatient for prophylaxis   - An outpatient hospital follow up appointment has been requested with the neurology team  The office will call the patient to help set up an appointment  - Medical management and supportive care per primary team  Correction of any metabolic or infectious disturbances         If determined to be migraine:   Lia Gomez will need follow up in in 6 weeks with headache attending or advance practitioner  She will not require outpatient neurological testing  If truly SAH:   Would likely require both neurovascular and headache follow up  Will advise further pending additional workup  History of Present Illness     Reason for Consult / Principal Problem: Headache    HPI: Nadege Akins is a 19 y o  female with migraines, PTSD, anxiety/depression, who presented to [place] ED on 09/27 with headache, blurred vision, and nausea  Patient was visiting her brother yesterday when symptoms started, which was about 1-2 hours prior to arrival in the ED  Patient reports she 1st noticed that when she was using her phone, she felt like she could not see  She then reports that her speech became garbled  This lasted for about 10 minutes and then she developed her typical unilateral upper extremity numbness followed by a headache  The only difference with the numbness this time was that it involved her whole hand rather than just a few fingers  She also noticed some numbness on the right side of her lips  She described the headache as both dull and pressure like  The headache has since resolved by time of neurology evaluation today  She did not use any intervention at home prior to coming in  On arrival to the ED, patient's blood pressure 116/72  Other vital signs were stable  Neurologic exam was unremarkable  CT head was performed with concern for possible subarachnoid hemorrhage in the right temporal horn  CTA head and neck was then performed  No evidence of aneurysm or other abnormalities  After contrast was given for CTA, patient developed some itching and a rash  She was given Benadryl and Solu-Medrol  At some point after that, she noted improvement in her symptoms  She was then transferred from [place] to [facility] for further evaluation  Patient reports that her migraines started after she received the 2nd Gardasil shot   She believes this was in May or June  Prior to that, she did not experience headaches  She reports that she gets an appearance of static in her vision prior to the headache  She will develop numbness in her face and hand  Usually it affects just a few fingers  It can be either side  She does not experience speech difficulty, weakness, sensory changes in her lower extremities, or balance issues  She then develops the headache, that is usually contralateral to the side of the numbness  By the time the headache comes on, the static vision resolves  Usually the numbness resolves as well, but can wax and wane  Patient does have chronic neck and shoulder pain since an injury she acquired during a cheerleading accident about 10 years ago  She reports that she feels like pain has been exacerbated by her headache, rather than causing the headache  Patient was seen in the ED on 06/13/2022 for headache  This appears to be the 1st headache she noted after her shot  In the ED at that time, she received Toradol, Benadryl, Reglan, Decadron, magnesium sulfate, and fluids  Her headache is noted to have resolved in the ED and she was discharged  She was seen by her PCP in early September  Her PCP prescribed her Imitrex to use as needed  Of note, patient's PCP wrote that if patient were to start experiencing aura like symptoms, the patient would be a switch from [address] [place] [address] to a non estrogen contraceptive  Inpatient consult to Neurology  Consult performed by: oRdney Zabala PA-C  Consult ordered by: Mark Lacey MD          Review of Systems   A 12 system ROS was completed  Other than the above mentioned complaints in the HPI and those commented on below, all remaining systems were negative:  Headache, speech changes, vision changes, and numbness has resolved  No complaints at this time        Historical Information   Past Medical History:   Diagnosis Date    Anxiety     Depression     Intractable migraine without aura and without status migrainosus 9/2/2022    PTSD (post-traumatic stress disorder)      Past Surgical History:   Procedure Laterality Date    KNEE SURGERY  2016    TONSILLECTOMY  2017     Social History   Social History     Substance and Sexual Activity   Alcohol Use Not Currently    Comment: Occ     Social History     Substance and Sexual Activity   Drug Use Yes    Types: Marijuana     E-Cigarette/Vaping    E-Cigarette Use Never User      E-Cigarette/Vaping Substances    Nicotine No     THC No     CBD No     Flavoring No     Other No     Unknown No      Social History     Tobacco Use   Smoking Status Never Smoker   Smokeless Tobacco Never Used     Family History:   Family History   Problem Relation Age of Onset    No Known Problems Mother     Hypertension Father     No Known Problems Brother     Colon cancer Maternal Grandmother     Lung cancer Maternal Grandfather     No Known Problems Paternal Grandmother     Diabetes Paternal Grandfather     Breast cancer Neg Hx     Ovarian cancer Neg Hx        Review of previous medical records was completed  Meds/Allergies   all current active meds have been reviewed, current meds:   Current Facility-Administered Medications   Medication Dose Route Frequency    LORazepam (ATIVAN) injection 1 mg  1 mg Intravenous Once    and PTA meds:   Prior to Admission Medications   Prescriptions Last Dose Informant Patient Reported? Taking? SUMAtriptan (Imitrex) 25 mg tablet   No No   Sig: Take 1 tablet (25 mg total) by mouth once as needed for migraine may take another tablet after 2 hours if migraine persists  Max 200 mg/24 hours     hydrOXYzine HCL (ATARAX) 25 mg tablet More than a month at Unknown time  No No   Sig: Take 1 tablet (25 mg total) by mouth every 6 (six) hours as needed for allergies or anxiety   lidocaine (Lidoderm) 5 %   No No   Sig: Apply 1 patch topically daily Remove & Discard patch within 12 hours or as directed by MD   norgestimate-ethinyl estradiol (Sprintec 28) 0 25-35 MG-MCG per tablet   No No   Sig: Take 1 tablet by mouth daily      Facility-Administered Medications: None       Allergies   Allergen Reactions    Shellfish-Derived Products - Food Allergy Hives     Varying degress of severity, unsure of mechanism    Iv Contrast [Iodinated Diagnostic Agents] Hives       Objective   Vitals:Blood pressure 119/75, pulse 75, temperature 98 2 °F (36 8 °C), temperature source Oral, resp  rate 18, height 5' 4" (1 626 m), weight 43 5 kg (95 lb 14 4 oz), last menstrual period 09/11/2022, SpO2 97 %  ,Body mass index is 16 46 kg/m²  Intake/Output Summary (Last 24 hours) at 9/28/2022 1551  Last data filed at 9/28/2022 0825  Gross per 24 hour   Intake --   Output 250 ml   Net -250 ml       Invasive Devices: Invasive Devices  Report    Peripheral Intravenous Line  Duration           Peripheral IV 09/28/22 Left Arm <1 day                Physical Exam:   Vital signs and nursing notes reviewed  Constitutional: Appears comfortable  Well developed/well nourished  No diaphoresis  No acute distress  HENT: Normocephalic, atraumatic  B/L external ears and nose appears normal  Oropharynx clear and moist    Eyes: EOMs intact without nystagmus  No scleral icterus or injection  No discharge  Neck: Supple, normal ROM  No stridor noted  Cardiovascular: Regular rate  Pulmonary: No respiratory distress  Effort normal  No stridor or wheezing evident  Musculoskeletal: Normal ROM  No tenderness, edema, or deformities noted  Neurological: Detailed below  Skin: Warm and dry  No erythema or rashes  No jaundice  Psychiatric: Normal mood and affect, normal thought process/thought content  No hallucinations  Good insight  Neurologic Exam:  Mental Status: Patient is awake and alert  Oriented x 3  Follows all commands without difficulty  No dysarthria  No aphasia  Cranial Nerves: Cranial nerves 2-12 intact       Motor: 5/5 strength throughout bilateral upper and lower extremities  Sensation: Sensation to light touch intact throughout  Coordination: Finger to nose testing normal      No tremors noted  Gait: Deferred       Lab Results: I have personally reviewed pertinent reports  Imaging Studies: I have personally reviewed pertinent reports  and I have personally reviewed pertinent films in PACS [facility])  EKG, Pathology, and Other Studies: I have personally reviewed pertinent reports      VTE Prophylaxis: Sequential compression device (Venodyne)  and Reason for no pharmacologic prophylaxis : ruling out ICH    Code Status: Level 1 - Full Code

## 2022-09-29 VITALS
SYSTOLIC BLOOD PRESSURE: 115 MMHG | TEMPERATURE: 98.1 F | OXYGEN SATURATION: 98 % | WEIGHT: 100.31 LBS | HEIGHT: 64 IN | HEART RATE: 64 BPM | DIASTOLIC BLOOD PRESSURE: 62 MMHG | RESPIRATION RATE: 18 BRPM | BODY MASS INDEX: 17.13 KG/M2

## 2022-09-29 PROBLEM — G43.019 INTRACTABLE MIGRAINE WITHOUT AURA AND WITHOUT STATUS MIGRAINOSUS: Status: RESOLVED | Noted: 2022-09-02 | Resolved: 2022-09-29

## 2022-09-29 PROBLEM — T78.40XA ALLERGIC REACTION: Status: ACTIVE | Noted: 2020-06-26

## 2022-09-29 PROBLEM — F32.2 CURRENT SEVERE EPISODE OF MAJOR DEPRESSIVE DISORDER WITHOUT PSYCHOTIC FEATURES WITHOUT PRIOR EPISODE (HCC): Status: ACTIVE | Noted: 2019-04-22

## 2022-09-29 PROBLEM — I60.9 SAH (SUBARACHNOID HEMORRHAGE) (HCC): Status: RESOLVED | Noted: 2022-09-28 | Resolved: 2022-09-29

## 2022-09-29 LAB
ANION GAP SERPL CALCULATED.3IONS-SCNC: 5 MMOL/L (ref 4–13)
BASOPHILS # BLD AUTO: 0.04 THOUSANDS/ΜL (ref 0–0.1)
BASOPHILS NFR BLD AUTO: 1 % (ref 0–1)
BUN SERPL-MCNC: 14 MG/DL (ref 5–25)
CALCIUM SERPL-MCNC: 9.2 MG/DL (ref 8.3–10.1)
CHLORIDE SERPL-SCNC: 107 MMOL/L (ref 96–108)
CO2 SERPL-SCNC: 25 MMOL/L (ref 21–32)
CREAT SERPL-MCNC: 0.88 MG/DL (ref 0.6–1.3)
EOSINOPHIL # BLD AUTO: 0.12 THOUSAND/ΜL (ref 0–0.61)
EOSINOPHIL NFR BLD AUTO: 1 % (ref 0–6)
ERYTHROCYTE [DISTWIDTH] IN BLOOD BY AUTOMATED COUNT: 12.7 % (ref 11.6–15.1)
GFR SERPL CREATININE-BSD FRML MDRD: 92 ML/MIN/1.73SQ M
GLUCOSE SERPL-MCNC: 92 MG/DL (ref 65–140)
HCT VFR BLD AUTO: 39.8 % (ref 34.8–46.1)
HGB BLD-MCNC: 13 G/DL (ref 11.5–15.4)
IMM GRANULOCYTES # BLD AUTO: 0.03 THOUSAND/UL (ref 0–0.2)
IMM GRANULOCYTES NFR BLD AUTO: 0 % (ref 0–2)
LYMPHOCYTES # BLD AUTO: 2.44 THOUSANDS/ΜL (ref 0.6–4.47)
LYMPHOCYTES NFR BLD AUTO: 28 % (ref 14–44)
MCH RBC QN AUTO: 29 PG (ref 26.8–34.3)
MCHC RBC AUTO-ENTMCNC: 32.7 G/DL (ref 31.4–37.4)
MCV RBC AUTO: 89 FL (ref 82–98)
MONOCYTES # BLD AUTO: 0.61 THOUSAND/ΜL (ref 0.17–1.22)
MONOCYTES NFR BLD AUTO: 7 % (ref 4–12)
NEUTROPHILS # BLD AUTO: 5.36 THOUSANDS/ΜL (ref 1.85–7.62)
NEUTS SEG NFR BLD AUTO: 63 % (ref 43–75)
NRBC BLD AUTO-RTO: 0 /100 WBCS
PLATELET # BLD AUTO: 212 THOUSANDS/UL (ref 149–390)
PMV BLD AUTO: 9.5 FL (ref 8.9–12.7)
POTASSIUM SERPL-SCNC: 3.8 MMOL/L (ref 3.5–5.3)
RBC # BLD AUTO: 4.49 MILLION/UL (ref 3.81–5.12)
SODIUM SERPL-SCNC: 137 MMOL/L (ref 135–147)
WBC # BLD AUTO: 8.6 THOUSAND/UL (ref 4.31–10.16)

## 2022-09-29 PROCEDURE — 80048 BASIC METABOLIC PNL TOTAL CA: CPT | Performed by: PSYCHIATRY & NEUROLOGY

## 2022-09-29 PROCEDURE — 99238 HOSP IP/OBS DSCHRG MGMT 30/<: CPT | Performed by: PSYCHIATRY & NEUROLOGY

## 2022-09-29 PROCEDURE — 85025 COMPLETE CBC W/AUTO DIFF WBC: CPT | Performed by: PSYCHIATRY & NEUROLOGY

## 2022-09-29 NOTE — DISCHARGE INSTRUCTIONS
You were seen here in the hospital for concerns about a headache  An MRI was done that did not show any signs of a bleed  You will need to follow up with the headache clinic within 6 weeks  Please take riboflavin and magnesium oxide as needed for your migraines  Take these supplements over-the-counter daily  They can help decrease the frequency of your migraines  If you have new or worsening headache with new symptoms please return for evaluation in an emergency room

## 2022-09-29 NOTE — DISCHARGE SUMMARY
[facility]  Discharge- Amberly Khan 2000, 19 y o  female MRN: [id number]  Unit/Bed#: [facility] 722-01 Encounter: [id number]  Primary Care Provider: Sherrie Sharma MD   Date and time admitted to hospital: 9/28/2022  8:21 AM    Headache  Assessment & Plan  Patient seen in ED for head ache with word finding difficulty and vision changes  Resolved prior to admission in ICU, however CT head showed a possible SAH  Patient admitted here to the Neuro ICU on step down, MRI ordered  MRI brain with and without contrast showing "No intracranial pathology  Questioned subarachnoid hemorrhage on CT is likely artifactual "    Patient without headache, or other symptomatology here  Neurology seen patient and recommends that patient follow up with headache clinic in 6 weeks, they will reach out to patient  Also recommend riboflavin and magnesium oxide supplementation  * Intractable migraine without aura and without status migrainosus-resolved as of 9/29/2022  Assessment & Plan  Patient currently without headache/ migraine  Continue home abortive medications as needed with 6 week f/u with headache clinic/neurology  Medical Problems             Resolved Problems  Date Reviewed: 9/29/2022          Resolved    * (Principal) Intractable migraine without aura and without status migrainosus 9/29/2022     Resolved by  Nigel Ma DO    SAH (subarachnoid hemorrhage) ([facility] [address] ) 9/29/2022     Resolved by  Nigel Ma DO                Admission Date:   Admission Orders (From admission, onward)     Ordered        09/28/22 0840  Inpatient Admission  Once                        Admitting Diagnosis: Brain bleed ([facility] [address] ) [I61 9]    HPI: 20 yo female presenting to outside campus with headache and left facial sensation changes, on CTH was found to have possible SAH in the right temporal horn and bilateral frontal regions although differential is streak artifact   CTA was negative for vascular abnormality  She was transferred to [facility] AND [facility] ICU for further workup including MRI and neurosurgical evaluation if findings consistent with SAH  Has reported frequent headaches since June of this year shortly after receiving the Guardasil vaccine  Her headaches are migrainous and frontal/left with hemisensory changes  Procedures Performed: No orders of the defined types were placed in this encounter  Summary of Hospital Course: head ache with word finding difficulty and vision changes  Resolved prior to admission in ICU, however CT head showed a possible SAH  Patient admitted here to the Neuro ICU on step down, MRI ordered  MRI brain with and without contrast showing "No intracranial pathology  Questioned subarachnoid hemorrhage on CT is likely artifactual "     Patient without headache, or other symptomatology here  Neurology seen patient and recommends that patient follow up with headache clinic in 6 weeks, they will reach out to patient  Also recommend riboflavin and magnesium oxide supplementation     Significant Findings, Care, Treatment and Services Provided: MRI brain w/wo contrast, Critical Care admission and monitoring         Complications: None    Condition at Discharge: good         Discharge instructions/Information to patient and family:   See after visit summary for information provided to patient and family  Provisions for Follow-Up Care:  See after visit summary for information related to follow-up care and any pertinent home health orders  PCP: Margarita Combs MD    Disposition: See After Visit Summary for discharge disposition information  Planned Readmission: No    Discharge Statement   I spent 30 minutes discharging the patient  This time was spent on the day of discharge  I had direct contact with the patient on the day of discharge  Additional documentation is required if more than 30 minutes were spent on discharge       Discharge Medications:  See after visit summary for reconciled discharge medications provided to patient and family

## 2022-09-29 NOTE — UTILIZATION REVIEW
Initial Clinical Review    Admission: Date/Time/Statement:   Admission Orders (From admission, onward)     Ordered        09/28/22 0840  Inpatient Admission  Once                      Orders Placed This Encounter   Procedures    Inpatient Admission     Standing Status:   Standing     Number of Occurrences:   1     Order Specific Question:   Level of Care     Answer:   Critical Care [15]     Order Specific Question:   Estimated length of stay     Answer:   More than 2 Midnights     Order Specific Question:   Certification     Answer:   I certify that inpatient services are medically necessary for this patient for a duration of greater than two midnights  See H&P and MD Progress Notes for additional information about the patient's course of treatment  Initial Presentation: 19 y o  female who initially presented to [place] ED on 9/27 with c/o headache and left facial sensation changes  Patient states yesterday she was going to visit her brother when she developed acute onset blurry vision, slurred speech/difficulty understanding her speech and numbness around the right side of her lip as well as right arm  She states this lasted no more than 10 minutes  CTH was found to have possible SAH in the right temporal horn and bilateral frontal regions although differential is streak artifact  CTA was negative for vascular abnormality  She was transferred to [facility] ICU for further workup including MRI and neurosurgical evaluation if findings consistent with SAH  Has reported frequent headaches since June of this year shortly after receiving the Guardasil vaccine  Her headaches are migrainous and frontal/left with hemisensory changes    [facility] ICU, neurosurgery and neurology consults, neuro checks,Tylenol prn, monitor VS and O2, supplementatl O2 prn, monitor IO and daily wts, keep NPO, daily cbc     9/28 Neurosurgery Consult:  SAH:  Continue to monitor neurological exam, currently GCS 15 and nonfocal   Imaging results reviewed with patient, demonstrates possible subarachnoid hemorrhage versus artifact  Unclear etiology of subarachnoid hemorrhages patient denies any trauma and there is no evidence of vascular etiology on CTA  Order MRI brain, hold pharm dvt ppx until MRI is completed, PT/OT eval     9/28 Neurology Consult:  Pt started having new onset migraines this past May or June, with a preceding visual aura and often tingling in her face and fingers contralateral to the headache  She presented because of what sounds like more intense aura symptoms, ie being unable to see during her visual aura, and the numbness being more severe and in a larger area, and also new garbled speech, although the headache itself was not severe as it usually is  Her CT in the ED showed concern for subarachnoid hemorrhage and CTA was unrevealing  It would seem to be unlikely that she truly has [address] given that her headache was less intense, however, given the CT findings, and that the migraines are new and associated with focal neurologic symptoms, she needs an MRI brain w/wo completed    More likely is that this was just a more intense migraine aura than she has previously experienced, possibly related to flaring up her neck pain when exercising shortly before it occurred         Triage Vitals   Temperature Pulse Respirations Blood Pressure SpO2   09/28/22 0830 09/28/22 0830 09/28/22 0830 09/28/22 0830 09/28/22 0830   97 7 °F (36 5 °C) 86 15 118/69 98 %      Temp Source Heart Rate Source Patient Position - Orthostatic VS BP Location FiO2 (%)   09/28/22 0830 09/28/22 0830 09/28/22 1349 09/28/22 0830 --   Oral Monitor Lying Left arm       Pain Score       09/28/22 0825       No Pain          Wt Readings from Last 1 Encounters:   09/29/22 45 5 kg (100 lb 5 oz)     Additional Vital Signs:   Date/Time Temp Pulse Resp BP MAP (mmHg) SpO2 O2 Device   09/29/22 0700 98 1 °F (36 7 °C) 64 18 115/62 -- 98 % --   09/29/22 0400 -- -- -- 104/65 -- -- -- 09/29/22 0300 -- 80 -- -- 79 98 % --   09/29/22 0000 -- -- -- 104/63 -- -- --   09/28/22 2000 -- 85 -- 108/66 -- -- None (Room air)   09/28/22 1349 98 2 °F (36 8 °C) 75 18 119/75 92 97 % --   09/28/22 1300 -- 66 18 99/57 73 97 % None (Room air)   09/28/22 1200 -- 72 20 104/63 76 96 % None (Room air)   09/28/22 1130 -- 78 21 109/66 78 97 % None (Room air)   09/28/22 1125 -- 80 24 Abnormal  108/62 78 97 % None (Room air)   09/28/22 0940 -- 76 22 119/64 85 98 % None (Room air)   09/28/22 0830 97 7 °F (36 5 °C) 86 15 118/69 78 98 % None (Room air)       Pertinent Labs/Diagnostic Test Results:   MRI brain w wo contrast   Final Result by Pacheco Cespedes MD (09/29 3127)   No intracranial pathology   Questioned subarachnoid hemorrhage on CT is likely artifactual       Workstation performed: [id number]               Results from last 7 days   Lab Units 09/29/22 0439 09/28/22  0948 09/28/22  0001   WBC Thousand/uL 8 60 6 15 10 01   HEMOGLOBIN g/dL 13 0 13 5 14 4   HEMATOCRIT % 39 8 40 9 43 6   PLATELETS Thousands/uL 212 229 256   NEUTROS ABS Thousands/µL 5 36 5 50 5 83     Results from last 7 days   Lab Units 09/29/22 0439 09/28/22  0948 09/28/22  0001   SODIUM mmol/L 137 139 132*   POTASSIUM mmol/L 3 8 4 0 3 5   CHLORIDE mmol/L 107 107 101   CO2 mmol/L 25 24 25   ANION GAP mmol/L 5 8 6   BUN mg/dL 14 11 13   CREATININE mg/dL 0 88 0 83 0 76   EGFR ml/min/1 73sq m 92 99 110   CALCIUM mg/dL 9 2 9 4 9 8   MAGNESIUM mg/dL  --  2 2  --    PHOSPHORUS mg/dL  --  3 6  --      Results from last 7 days   Lab Units 09/28/22  0001   AST U/L 17   ALT U/L 18   ALK PHOS U/L 31*   TOTAL PROTEIN g/dL 7 5   ALBUMIN g/dL 4 4   TOTAL BILIRUBIN mg/dL 0 34     Results from last 7 days   Lab Units 09/29/22  0439 09/28/22  0948 09/28/22  0001   GLUCOSE RANDOM mg/dL 92 129 83     Results from last 7 days   Lab Units 09/28/22  0001   PROTIME seconds 13 3   INR  0 98   PTT seconds 23         Past Medical History:   Diagnosis Date    Anxiety     Depression     Intractable migraine without aura and without status migrainosus 9/2/2022    PTSD (post-traumatic stress disorder)      Present on Admission:   (Resolved) Intractable migraine without aura and without status migrainosus      Admitting Diagnosis: Brain bleed ([facility] [address] ) [I61 9]  Age/Sex: 19 y o  female  Admission Orders:  Scheduled Medications:  melatonin, 3 mg, Oral, HS      Continuous IV Infusions: none     PRN Meds:  acetaminophen, 650 mg, Oral, Q6H PRN x1 thus far    scd    IP CONSULT TO CASE MANAGEMENT  IP CONSULT TO NEUROSURGERY  IP CONSULT TO NEUROLOGY    Network Utilization Review Department  ATTENTION: Please call with any questions or concerns to [phone] and carefully listen to the prompts so that you are directed to the right person  All voicemails are confidential   Mari Smith all requests for admission clinical reviews, approved or denied determinations and any other requests to dedicated fax number below belonging to the campus where the patient is receiving treatment   List of dedicated fax numbers for the Facilities:  [address] DENIALS (Administrative/Medical Necessity) [phone]   [address] (Maternity/NICU/Pediatrics) [phone]   [address] [place] [address] [address] [place] [address]  [address] [id number] [place] [address] [address] [facility] [phone]

## 2022-09-29 NOTE — DISCHARGE SUMMARY
Discharge Summary - Luca Luna 19 y o  female MRN: [id number]    [facility] Encounter: [id number]    Admission Date:   Admission Orders (From admission, onward)     Ordered        09/28/22 0840  Inpatient Admission  Once                        Admitting Diagnosis: Brain bleed ([address] ) [I61 9]    HPI: 20 yo female presenting to outside campus with headache and left facial sensation changes, on CTH was found to have possible SAH in the right temporal horn and bilateral frontal regions although differential is streak artifact  CTA was negative for vascular abnormality  She was transferred to [facility] ICU for further workup including MRI and neurosurgical evaluation if findings consistent with SAH  Has reported frequent headaches since June of this year shortly after receiving the Guardasil vaccine  Her headaches are migrainous and frontal/left with hemisensory changes        Procedures Performed: No orders of the defined types were placed in this encounter  Summary of Hospital Course: head ache with word finding difficulty and vision changes  Resolved prior to admission in ICU, however CT head showed a possible SAH  Patient admitted here to the Neuro ICU on step down, MRI ordered  MRI brain with and without contrast showing "No intracranial pathology  Questioned subarachnoid hemorrhage on CT is likely artifactual "    Patient without headache, or other symptomatology here  Neurology seen patient and recommends that patient follow up with headache clinic in 6 weeks, they will reach out to patient  Also recommend riboflavin and magnesium oxide supplementation     Significant Findings, Care, Treatment and Services Provided: MRI brain w/wo contrast, Critical Care admission and monitoring       Visit Vitals  /62   Pulse 64   Temp 98 1 °F (36 7 °C) (Oral)   Resp 18   Ht 5' 4" (1 626 m)   Wt 45 5 kg (100 lb 5 oz)   LMP 09/11/2022 (Exact Date)   SpO2 98%   BMI 17 22 kg/m²   OB Status Having periods Smoking Status Never Smoker   BSA 1 46 m²       Physical Exam:   Vitals and nursing note reviewed  Constitutional:       General: She is not in acute distress  Appearance: Normal appearance  She is well-developed and normal weight  She is not ill-appearing  HENT:      Head: Normocephalic and atraumatic  Nose: Nose normal       Mouth/Throat:      Mouth: Mucous membranes are moist       Pharynx: Oropharynx is clear  Eyes:      Conjunctiva/sclera: Conjunctivae normal    Cardiovascular:      Rate and Rhythm: Normal rate and regular rhythm  Heart sounds: No murmur heard  Pulmonary:      Effort: Pulmonary effort is normal  No respiratory distress  Breath sounds: Normal breath sounds  Abdominal:      Palpations: Abdomen is soft  Tenderness: There is no abdominal tenderness  Musculoskeletal:      Cervical back: Neck supple  Skin:     General: Skin is warm and dry  Capillary Refill: Capillary refill takes less than 2 seconds  Neurological:      General: No focal deficit present  Mental Status: She is alert and oriented to person, place, and time  Mental status is at baseline  Cranial Nerves: No cranial nerve deficit  Sensory: No sensory deficit  Motor: No weakness        Coordination: Coordination normal       Gait: Gait normal       Deep Tendon Reflexes: Reflexes normal    Psychiatric:         Mood and Affect: Mood normal          Behavior: Behavior normal    Complications: None    Discharge Diagnosis: Headache    Medical Problems             Resolved Problems  Never Reviewed          Resolved    * (Principal) Intractable migraine without aura and without status migrainosus 9/29/2022     Resolved by  Gray Hdez DO    SAH (subarachnoid hemorrhage) ([facility] ) 9/29/2022     Resolved by  Gray Hdez DO                Condition at Discharge: good         Discharge instructions/Information to patient and family:   See after visit summary for information provided to patient and family  Provisions for Follow-Up Care:  See after visit summary for information related to follow-up care and any pertinent home health orders  PCP: Mario Whittington MD    Disposition: Home    Planned Readmission: No      Discharge Statement   I spent 30 minutes discharging the patient  This time was spent on the day of discharge  I had direct contact with the patient on the day of discharge  Additional documentation is required if more than 30 minutes were spent on discharge  Discharge Medications:  See after visit summary for reconciled discharge medications provided to patient and family

## 2022-09-29 NOTE — PROGRESS NOTES
Progress Note - Neurology   Nirali Gardner 19 y o  female [id number]  Unit/Bed#: [facility]-01    Assessment/Plan:    Headache  Assessment & Plan  19year old female with migraines, presented to the ED for evaluation after experiencing 10 minutes of blurred vision and dysarthria, followed by a left sided pressure like headache that has since resolved  She also experienced numbness on the right side of her lips and right arm, which is common with her migraines, however the preceding symptoms were not typical for her  On arrival, blood pressure was 116/72  Other vitals were stable  [facility] concerning for possible subarachnoid hemorrhage in the right temporal horn and bilateral frontal lobes  CTA unremarkable without source of bleeding  Patient was transferred to [facility] for further evaluation  MRI brain did not reveal any evidence of SAH  Findings on CTH determined to be artifact  Patient's presentation most likely a migraine with aura/complex migraine  Plan:  - Encourage adequate rest/good sleep hygiene and hydration  - Tylenol PRN   - Can take Riboflavin and magnesium oxide as an outpatient for prophylaxis   - An outpatient hospital follow up appointment has been requested with the neurology team  The office will call the patient to help set up an appointment  - Medical management and supportive care per primary team  Correction of any metabolic or infectious disturbances    - Patient encouraged to return to the hospital with any new onset severe headache, atypical of her migraines, or new stroke like symptoms atypical of her aura  Patient is stable for discharge from a neurologic standpoint  Nirali Gardner will need follow up in in 6 weeks with headache AP or attending  She will not require outpatient neurological testing  Subjective:   Patient reports she is feeling well today  She denies any recurrence of symptoms including headache  She did well overnight    MRI brain was completed and did not show any evidence of hemorrhage  Past Medical History:   Diagnosis Date    Anxiety     Depression     Intractable migraine without aura and without status migrainosus 9/2/2022    PTSD (post-traumatic stress disorder)      Past Surgical History:   Procedure Laterality Date    KNEE SURGERY  2016    TONSILLECTOMY  2017     Family History   Problem Relation Age of Onset    No Known Problems Mother     Hypertension Father     No Known Problems Brother     Colon cancer Maternal Grandmother     Lung cancer Maternal Grandfather     No Known Problems Paternal Grandmother     Diabetes Paternal Grandfather     Breast cancer Neg Hx     Ovarian cancer Neg Hx      Social History     Socioeconomic History    Marital status: Single     Spouse name: None    Number of children: 0    Years of education: None    Highest education level: None   Occupational History    None   Tobacco Use    Smoking status: Never Smoker    Smokeless tobacco: Never Used   Vaping Use    Vaping Use: Never used   Substance and Sexual Activity    Alcohol use: Not Currently     Comment: Occ    Drug use: Yes     Types: Marijuana    Sexual activity: Yes     Partners: Male     Birth control/protection: OCP   Other Topics Concern    None   Social History Narrative    None     Social Determinants of Health     Financial Resource Strain: Low Risk     Difficulty of Paying Living Expenses: Not hard at all   Food Insecurity: No Food Insecurity    Worried About Running Out of Food in the Last Year: Never true    Karina of Food in the Last Year: Never true   Transportation Needs: No Transportation Needs    Lack of Transportation (Medical): No    Lack of Transportation (Non-Medical):  No   Physical Activity: Inactive    Days of Exercise per Week: 0 days    Minutes of Exercise per Session: 0 min   Stress: No Stress Concern Present    Feeling of Stress : Not at all   Social Connections: Socially Isolated    Frequency of Communication with Friends and Family: More than three times a week    Frequency of Social Gatherings with Friends and Family: More than three times a week    Attends Anabaptism Services: Never    Active Member of Clubs or Organizations: No    Attends Club or Organization Meetings: Never    Marital Status: Never    Intimate Partner Violence: Not At Risk    Fear of Current or Ex-Partner: No    Emotionally Abused: No    Physically Abused: No    Sexually Abused: No   Housing Stability: [address] Unable to Pay for Housing in the Last Year: No    Number of [place] in the Last Year: 1    Unstable Housing in the Last Year: No         Medications: All current active meds have been reviewed and current meds:  Scheduled Meds:  Current Facility-Administered Medications   Medication Dose Route Frequency Provider Last Rate    acetaminophen  650 mg Oral Q6H PRN Demetri Robertson MD      melatonin  3 mg Oral HS Shara Whalen MD       Continuous Infusions:   PRN Meds:   acetaminophen       ROS:   Review of Systems   All other systems reviewed and are negative  All symptoms have resolved without reoccurrence  Vitals:   /62   Pulse 64   Temp 98 1 °F (36 7 °C) (Oral)   Resp 18   Ht 5' 4" (1 626 m)   Wt 45 5 kg (100 lb 5 oz)   LMP 09/11/2022 (Exact Date)   SpO2 98%   BMI 17 22 kg/m²       Physical Exam:   Vital signs and nursing notes reviewed  Constitutional: Patient is resting comfortably  Well developed, well nourished, in no acute distress  No diaphoresis  HENT: Normocephalic, atraumatic  Eyes: EOMs intact  No scleral icterus or injection  No discharge  Neck: Supple, normal ROM  No stridor noted  Cardiovascular: Regular rate per review of vitals  Pulmonary: No respiratory distress  Effort normal    Musculoskeletal: Moves all extremities spontaneously and anti-gravity  Neurological: Alert  Follows all commands  Detailed below  Skin: Warm and dry  Psychiatric: Normal mood and affect  No hallucinations or agitation  Neurologic Exam:  Mental Status: Patient is awake and alert  No dysarthria  No aphasia  Cranial Nerves: Cranial nerves 2-12 grossly intact  Motor: Moves all extremities spontaneously and anti-gravity  No focal weakness  No tremors noted  Gait: Deferred         Labs: I have personally reviewed pertinent reports     Recent Results (from the past 24 hour(s))   CBC and differential    Collection Time: 09/28/22  9:48 AM   Result Value Ref Range    WBC 6 15 4 31 - 10 16 Thousand/uL    RBC 4 68 3 81 - 5 12 Million/uL    Hemoglobin 13 5 11 5 - 15 4 g/dL    Hematocrit 40 9 34 8 - 46 1 %    MCV 87 82 - 98 fL    MCH 28 8 26 8 - 34 3 pg    MCHC 33 0 31 4 - 37 4 g/dL    RDW 12 4 11 6 - 15 1 %    MPV 9 2 8 9 - 12 7 fL    Platelets 341 202 - 654 Thousands/uL    nRBC 0 /100 WBCs    Neutrophils Relative 89 (H) 43 - 75 %    Immat GRANS % 0 0 - 2 %    Lymphocytes Relative 10 (L) 14 - 44 %    Monocytes Relative 1 (L) 4 - 12 %    Eosinophils Relative 0 0 - 6 %    Basophils Relative 0 0 - 1 %    Neutrophils Absolute 5 50 1 85 - 7 62 Thousands/µL    Immature Grans Absolute 0 01 0 00 - 0 20 Thousand/uL    Lymphocytes Absolute 0 59 (L) 0 60 - 4 47 Thousands/µL    Monocytes Absolute 0 04 (L) 0 17 - 1 22 Thousand/µL    Eosinophils Absolute 0 00 0 00 - 0 61 Thousand/µL    Basophils Absolute 0 01 0 00 - 0 10 Thousands/µL   Basic metabolic panel    Collection Time: 09/28/22  9:48 AM   Result Value Ref Range    Sodium 139 135 - 147 mmol/L    Potassium 4 0 3 5 - 5 3 mmol/L    Chloride 107 96 - 108 mmol/L    CO2 24 21 - 32 mmol/L    ANION GAP 8 4 - 13 mmol/L    BUN 11 5 - 25 mg/dL    Creatinine 0 83 0 60 - 1 30 mg/dL    Glucose 129 65 - 140 mg/dL    Calcium 9 4 8 3 - 10 1 mg/dL    eGFR 99 ml/min/1 73sq m   Magnesium    Collection Time: 09/28/22  9:48 AM   Result Value Ref Range    Magnesium 2 2 1 6 - 2 6 mg/dL   Phosphorus    Collection Time: 09/28/22  9:48 AM Result Value Ref Range    Phosphorus 3 6 2 7 - 4 5 mg/dL   CBC and differential    Collection Time: 09/29/22  4:39 AM   Result Value Ref Range    WBC 8 60 4 31 - 10 16 Thousand/uL    RBC 4 49 3 81 - 5 12 Million/uL    Hemoglobin 13 0 11 5 - 15 4 g/dL    Hematocrit 39 8 34 8 - 46 1 %    MCV 89 82 - 98 fL    MCH 29 0 26 8 - 34 3 pg    MCHC 32 7 31 4 - 37 4 g/dL    RDW 12 7 11 6 - 15 1 %    MPV 9 5 8 9 - 12 7 fL    Platelets 378 184 - 885 Thousands/uL    nRBC 0 /100 WBCs    Neutrophils Relative 63 43 - 75 %    Immat GRANS % 0 0 - 2 %    Lymphocytes Relative 28 14 - 44 %    Monocytes Relative 7 4 - 12 %    Eosinophils Relative 1 0 - 6 %    Basophils Relative 1 0 - 1 %    Neutrophils Absolute 5 36 1 85 - 7 62 Thousands/µL    Immature Grans Absolute 0 03 0 00 - 0 20 Thousand/uL    Lymphocytes Absolute 2 44 0 60 - 4 47 Thousands/µL    Monocytes Absolute 0 61 0 17 - 1 22 Thousand/µL    Eosinophils Absolute 0 12 0 00 - 0 61 Thousand/µL    Basophils Absolute 0 04 0 00 - 0 10 Thousands/µL   Basic metabolic panel    Collection Time: 09/29/22  4:39 AM   Result Value Ref Range    Sodium 137 135 - 147 mmol/L    Potassium 3 8 3 5 - 5 3 mmol/L    Chloride 107 96 - 108 mmol/L    CO2 25 21 - 32 mmol/L    ANION GAP 5 4 - 13 mmol/L    BUN 14 5 - 25 mg/dL    Creatinine 0 88 0 60 - 1 30 mg/dL    Glucose 92 65 - 140 mg/dL    Calcium 9 2 8 3 - 10 1 mg/dL    eGFR 92 ml/min/1 73sq m       Imaging: I have personally reviewed pertinent imaging in PACS, including MRI,   and I have personally reviewed PACS reports  EKG, Pathology, and Other Studies: I have personally reviewed pertinent reports  VTE Prophylaxis: Sequential compression device (Venodyne)       Counseling / Coordination of Care  Total time spent today 15 minutes  Greater than 50% of total time was spent with the patient and/or family counseling and/or coordination of care  A description of the counseling/coordination of care:  Patient was seen and evaluated    Discussed with attending  Chart reviewed thoroughly including laboratory and imaging studies  Discussed MRI results, headache prevention, meds   Plan of care discussed with patient and primary team

## 2022-09-29 NOTE — TREATMENT PLAN
Imaging:  · MRI brain without contrast 09/28/2022:  No intracranial pathology    Question subarachnoid hemorrhage on CT is likely artifactual     Plan:  · Unable to see patient prior to discharge however no neurosurgical intervention warranted  · Imaging demonstrates artifactual findings rather than acute hemorrhage  · Patient's symptoms are likely complex migraines, should have close follow-up with Neurology  · Signed off, no follow-up, please call questions or concerns

## 2022-09-29 NOTE — ASSESSMENT & PLAN NOTE
Patient seen in ED for head ache with word finding difficulty and vision changes  Resolved prior to admission in ICU, however CT head showed a possible SAH  Patient admitted here to the Neuro ICU on step down, MRI ordered  MRI brain with and without contrast showing "No intracranial pathology  Questioned subarachnoid hemorrhage on CT is likely artifactual "    Patient without headache, or other symptomatology here  Neurology seen patient and recommends that patient follow up with headache clinic in 6 weeks, they will reach out to patient  Also recommend riboflavin and magnesium oxide supplementation

## 2022-09-29 NOTE — RESTORATIVE TECHNICIAN NOTE
Restorative Technician Note      Patient Name: Arlyn Taylor     Note Type: Mobility  Patient Position Upon Consult: Supine  Activity Performed: Ambulated; Dangled; Stood  Assistive Device: Other (Comment) (none)  Education Provided: Yes  Patient Position at End of Consult: Supine;  All needs within reach; Bed/Chair alarm activated    Mary Grace SANTIAGO, Restorative Technician, [employer]

## 2022-09-29 NOTE — ASSESSMENT & PLAN NOTE
Patient currently without headache/ migraine  Continue home abortive medications as needed with 6 week f/u with headache clinic/neurology

## 2022-10-03 ENCOUNTER — TRANSITIONAL CARE MANAGEMENT (OUTPATIENT)
Dept: FAMILY MEDICINE CLINIC | Facility: CLINIC | Age: 22
End: 2022-10-03

## 2022-10-06 ENCOUNTER — TELEPHONE (OUTPATIENT)
Dept: NEUROLOGY | Facility: CLINIC | Age: 22
End: 2022-10-06

## 2022-10-06 NOTE — TELEPHONE ENCOUNTER
LAURA/HA            NOTES: Ginna Dhaliwal will need follow up in in 6 weeks with headache AP or attending  She will not require outpatient neurological testing  SCHEDULED: 12/6/22 @ 10AM W/[facility] IN [place]  ADDED TO WAIT LIST  LAST [facility] LOGIN 10/4/22  LEFT DETAILED MESSAGE W/ALL APPOINTMENT DETAILS/OFFICE ADDRESS/PHONE NUMBER  SENT APPOINTMENT CARD/DIRECTIONS
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
## 2022-10-21 ENCOUNTER — PATIENT OUTREACH (OUTPATIENT)
Dept: FAMILY MEDICINE CLINIC | Facility: CLINIC | Age: 22
End: 2022-10-21

## 2022-10-21 ENCOUNTER — TELEPHONE (OUTPATIENT)
Dept: NEUROLOGY | Facility: CLINIC | Age: 22
End: 2022-10-21

## 2022-10-21 NOTE — TELEPHONE ENCOUNTER
Attempted to reach pt to offer earlier appt time off the waitlist with Dr Alvarez Later for 10/24 at 2:30   [address] pt to call back if this appt works and provided call back number

## 2022-10-21 NOTE — PROGRESS NOTES
PABLO SANDOVAL outreached patient to follow up on her progress with applying for MA/SNAP benefits as receiving insurance benefits will make the process easier to establish outpatient mental health  Patient did not answer the phone  PABLO SANDOVAL left a voicemail requesting a return call  PABLO SANDOVAL will continue to follow and provide psychosocial support as necessary

## 2022-11-14 ENCOUNTER — PATIENT OUTREACH (OUTPATIENT)
Dept: FAMILY MEDICINE CLINIC | Facility: CLINIC | Age: 22
End: 2022-11-14

## 2022-12-15 ENCOUNTER — TELEPHONE (OUTPATIENT)
Dept: OBGYN CLINIC | Facility: CLINIC | Age: 22
End: 2022-12-15

## 2022-12-16 ENCOUNTER — TELEPHONE (OUTPATIENT)
Dept: NEUROLOGY | Facility: CLINIC | Age: 22
End: 2022-12-16

## 2022-12-22 ENCOUNTER — TELEPHONE (OUTPATIENT)
Dept: NEUROLOGY | Facility: CLINIC | Age: 22
End: 2022-12-22

## 2022-12-29 ENCOUNTER — OFFICE VISIT (OUTPATIENT)
Dept: NEUROLOGY | Facility: CLINIC | Age: 22
End: 2022-12-29

## 2022-12-29 VITALS
HEART RATE: 70 BPM | BODY MASS INDEX: 22.18 KG/M2 | RESPIRATION RATE: 16 BRPM | WEIGHT: 129.2 LBS | DIASTOLIC BLOOD PRESSURE: 60 MMHG | TEMPERATURE: 98.5 F | OXYGEN SATURATION: 99 % | SYSTOLIC BLOOD PRESSURE: 110 MMHG

## 2022-12-29 DIAGNOSIS — G43.109 MIGRAINE WITH AURA AND WITHOUT STATUS MIGRAINOSUS, NOT INTRACTABLE: Primary | ICD-10-CM

## 2022-12-29 PROBLEM — R51.9 HEADACHE: Status: RESOLVED | Noted: 2022-09-28 | Resolved: 2022-12-29

## 2022-12-29 RX ORDER — SUMATRIPTAN 100 MG/1
100 TABLET, FILM COATED ORAL ONCE AS NEEDED
Qty: 12 TABLET | Refills: 11 | Status: SHIPPED | OUTPATIENT
Start: 2022-12-29

## 2022-12-29 RX ORDER — PROCHLORPERAZINE MALEATE 10 MG
10 TABLET ORAL EVERY 6 HOURS PRN
Qty: 12 TABLET | Refills: 11 | Status: SHIPPED | OUTPATIENT
Start: 2022-12-29

## 2022-12-29 NOTE — ASSESSMENT & PLAN NOTE
Concha Rivas is a 23year old female with chronic complex migraines with aura that began in April 2022  Her current frequency is about once per month however her migraines are severe, rated 10/10 and can last anywhere from 8-48 hours  She has found some relief with sumatriptan 25 mg  Her exam remains excellent without any focal neurologic, motor or sensory deficits  We discussed today the importance of treating her migraine headaches at the onset of symptoms rather than waiting  As she has found some relief with Sumatriptan we will increase this further to 100 mg and I have asked her to combine this with  Ibuprofen and compazine to be used as her abortive therapy  We discussed rebound and medication overuse headaches and as such I have asked her to limit her use to no more than 3 times per week  She will also add Riboflavin or a B complex to her current vitamin regimen for added support  As she is not having more than 8 migraine days a month, I do not feel she requires a preventative medication at this time  However, this is a consideration for the future if she feels her headaches become more frequent, severe, or debilitating  She will follow up in 4 months, sooner if needed  Plan as outlined below  Plan:    Headache/migraine treatment:   - When you have a moderate to severe headache, you should seek rest, initiate relaxation and apply cold compresses to the head  Abortive medications (for immediate treatment of a headache): It is ok to take ibuprofen, acetaminophen or naproxen (Advil, Tylenol,  Aleve, Excedrin) if they help your headaches you should limit these to no more than 3 times a week to avoid medication overuse/rebound headaches       Use Sumatriptan 100 mg +/- Ibuprofen 400 mg +/- Compazine 10 mg at the onset of your migraine symptoms      Over the counter preventive supplements for headaches/migraines   (to take every day to help prevent headaches - not to take at the time of headache):  [x] Magnesium 500mg daily (If any diarrhea or upset stomach, decrease dose  as tolerated)  [x] Riboflavin (Vitamin B2) 400mg daily (FYI B2 may make your urine bright/neon yellow) or a B complex        Self-Monitoring:  [x] Headache calendar  Each day jim a number from 0-10 indicating if there was a headache and how bad it was  This can be used to monitor gradual improvement and is helpful to make medication adjustments  You can do this on paper or there is an REGINALD for a smart phone called "Migraine e Diary"  Lifestyle Recommendations:  [x] SLEEP - Maintain a regular sleep schedule: Adults need at least 7-8 hours of uninterrupted a night  Maintain good sleep hygiene:  Going to bed and waking up at consistent times, avoiding excessive daytime naps, avoiding caffeinated beverages in the evening, avoid excessive stimulation in the evening and generally using bed primarily for sleeping  One hour before bedtime would recommend turning lights down lower, decreasing your activity (may read quietly, listen to music at a low volume)  When you get into bed, should eliminate all technology (no texting, emailing, playing with your phone, iPad or tablet in bed)  [x] HYDRATION - Maintain good hydration  Drink  2L of fluid a day (4 typical small water bottles)  [x] DIET - Maintain good nutrition  In particular don't skip meals and try and eat healthy balanced meals regularly  [x] TRIGGERS - Look for other triggers and avoid them: Limit caffeine to 1-2 cups a day or less  Avoid dietary triggers that you have noticed bring on your headaches (this could include aged cheese, peanuts, MSG, aspartame and nitrates)  [x] EXERCISE - physical exercise as we all know is good for you in many ways, and not only is good for your heart, but also is beneficial for your mental health, cognitive health and  chronic pain/headaches  I would encourage at the least 5 days of physical exercise weekly for at least 30 minutes  Education and Follow-up  [x] Please call with any questions or concerns  Of course if any new concerning symptoms go to the emergency department    [x] Follow up in 4 months, sooner if needed

## 2022-12-29 NOTE — PROGRESS NOTES
Patient ID: Sherri Land is a 23 y o  female  Assessment/Plan:    Migraine with aura and without status migrainosus, not intractable  Sherri Land is a 23year old female with chronic complex migraines with aura that began in April 2022  Her current frequency is about once per month however her migraines are severe, rated 10/10 and can last anywhere from 8-48 hours  She has found some relief with sumatriptan 25 mg  Her exam remains excellent without any focal neurologic, motor or sensory deficits  We discussed today the importance of treating her migraine headaches at the onset of symptoms rather than waiting  As she has found some relief with Sumatriptan we will increase this further to 100 mg and I have asked her to combine this with  Ibuprofen and compazine to be used as her abortive therapy  We discussed rebound and medication overuse headaches and as such I have asked her to limit her use to no more than 3 times per week  She will also add Riboflavin or a B complex to her current vitamin regimen for added support  As she is not having more than 8 migraine days a month, I do not feel she requires a preventative medication at this time  However, this is a consideration for the future if she feels her headaches become more frequent, severe, or debilitating  She will follow up in 4 months, sooner if needed  Plan as outlined below  Plan:    Headache/migraine treatment:   - When you have a moderate to severe headache, you should seek rest, initiate relaxation and apply cold compresses to the head  Abortive medications (for immediate treatment of a headache): It is ok to take ibuprofen, acetaminophen or naproxen (Advil, Tylenol,  Aleve, Excedrin) if they help your headaches you should limit these to no more than 3 times a week to avoid medication overuse/rebound headaches       Use Sumatriptan 100 mg +/- Ibuprofen 400 mg +/- Compazine 10 mg at the onset of your migraine symptoms      Over the counter preventive supplements for headaches/migraines   (to take every day to help prevent headaches - not to take at the time of headache):  [x] Magnesium 500mg daily (If any diarrhea or upset stomach, decrease dose  as tolerated)  [x] Riboflavin (Vitamin B2) 400mg daily (FYI B2 may make your urine bright/neon yellow) or a B complex        Self-Monitoring:  [x] Headache calendar  Each day jim a number from 0-10 indicating if there was a headache and how bad it was  This can be used to monitor gradual improvement and is helpful to make medication adjustments  You can do this on paper or there is an REGINALD for a smart phone called "Migraine e Diary"  Lifestyle Recommendations:  [x] SLEEP - Maintain a regular sleep schedule: Adults need at least 7-8 hours of uninterrupted a night  Maintain good sleep hygiene:  Going to bed and waking up at consistent times, avoiding excessive daytime naps, avoiding caffeinated beverages in the evening, avoid excessive stimulation in the evening and generally using bed primarily for sleeping  One hour before bedtime would recommend turning lights down lower, decreasing your activity (may read quietly, listen to music at a low volume)  When you get into bed, should eliminate all technology (no texting, emailing, playing with your phone, iPad or tablet in bed)  [x] HYDRATION - Maintain good hydration  Drink  2L of fluid a day (4 typical small water bottles)  [x] DIET - Maintain good nutrition  In particular don't skip meals and try and eat healthy balanced meals regularly  [x] TRIGGERS - Look for other triggers and avoid them: Limit caffeine to 1-2 cups a day or less  Avoid dietary triggers that you have noticed bring on your headaches (this could include aged cheese, peanuts, MSG, aspartame and nitrates)    [x] EXERCISE - physical exercise as we all know is good for you in many ways, and not only is good for your heart, but also is beneficial for your mental health, cognitive health and  chronic pain/headaches  I would encourage at the least 5 days of physical exercise weekly for at least 30 minutes  Education and Follow-up  [x] Please call with any questions or concerns  Of course if any new concerning symptoms go to the emergency department  [x] Follow up in 4 months, sooner if needed         Diagnoses and all orders for this visit:    Migraine with aura and without status migrainosus, not intractable  -     SUMAtriptan (Imitrex) 100 mg tablet; Take 1 tablet (100 mg total) by mouth once as needed for migraine may take another tablet after 2 hours if migraine persists  Max 200 mg/24 hours  -     prochlorperazine (COMPAZINE) 10 mg tablet; Take 1 tablet (10 mg total) by mouth every 6 (six) hours as needed (Migraine) Take 1 tablet with Sumatriptan at the onset of a migraine headache         I have spent 40 minutes in face to face time and chart review with this patient today  Subjective:    SHAHANA Beltre is a 19year old female with migraines, who presented to the ED 9/27/22 for evaluation after experiencing 10 minutes of blurred vision and dysarthria, followed by a left sided pressure like headache that has since resolved  She also experienced numbness on the right side of her lips and right arm, which is common with her migraines, however the preceding symptoms were not typical for her  On arrival, blood pressure was 116/72  Other vitals were stable  [address] concerning for possible subarachnoid hemorrhage in the right temporal horn and bilateral frontal lobes  CTA unremarkable without source of bleeding  Patient was transferred to [facility] for further evaluation       MRI brain did not reveal any evidence of SAH  Findings on CTH determined to be artifact     Patient's presentation most likely a migraine with aura/complex migraine          Plan:  - Encourage adequate rest/good sleep hygiene and hydration  - Tylenol PRN   - Can take Riboflavin and magnesium oxide as an outpatient for prophylaxis   - An outpatient hospital follow up appointment has been requested with the neurology team  The office will call the patient to help set up an appointment  - Medical management and supportive care per primary team  Correction of any metabolic or infectious disturbances         - Patient encouraged to return to the hospital with any new onset severe headache, atypical of her migraines, or new stroke like symptoms atypical of her aura  She returns to the office today in hospital follow up and states she first began experiencing migraines in April 2022  She denies any prior hx of headaches  She received her second gardasil vaccine and the next day developed unilateral facial, arm/hand numbness, static vision, and a hour later developed unilateral throbbing headache retro-orbitally and nausea  Since that time she has the same symptoms about once per month  On average symptoms last about 8-48 hours  Average pain level is 10/10  Other accompanying symptoms are neck tension  She uses sumatriptan 25 mg and this is effective about 50-60% of the time but does not provide complete relief  Sometimes Tylenol 500-1000 mg is more effective  She also finds relief with sleep  She is seeing a chiropractor and a functional medicine physician which is helpful and she is trying to follow a gluten free diet  Current Use of Meds to Treat HA:  Abortive meds: Sumatriptan, Tylenol  Preventive meds? Fish oil, magnesium     Additional Relevant History:  History of head/neck trauma? yes - sprained neck at 15-13 yo (sports injury), also has had 2 concussions in the past (in teens)   History of head/neck surgery? No; tonsils removed at 22 yo  Family h/o headache problems? yes - mom with migraines  Family history of aneurysms? no  Exposure to carbon monoxide? yes - possibly?  Gas leak for months in her apartment  Substance use: alcohol: ocasionally, caffeine: once per day  Water: 60 oz  Diet: trying to go gluten free; she does not skip meals, and snacks throughout the day  Sleep: 8 hours/night    Prior Evaluation/Treatments:  Have you seen another physician for your headaches? no  Prior work-up: MRI brain, CTA head - normal  Trigger point injections? no  Botox injections? no  Epidural injections? no  Prior PREVENTATIVE medications: none    The following portions of the patient's history were reviewed and updated as appropriate: allergies, current medications, past family history, past medical history, past social history and past surgical history  Objective:    Blood pressure 110/60, pulse 70, temperature 98 5 °F (36 9 °C), temperature source Temporal, resp  rate 16, weight 58 6 kg (129 lb 3 2 oz), SpO2 99 %  Neurological Exam    On neurological examination patient is alert, awake, oriented and in no distress  Speech is fluent without dysarthria or aphasia  Cranial nerves 2-12 were symmetrically intact bilaterally  No evidence of any focal weakness or sensory loss in the upper or lower extremities  Motor testing reveals 5/5 strength of the bilateral upper and lower extremities  There was no pronator drift  No fasciculations present  No abnormal involuntary movements  Finger- to-nose reveals no tremor or ataxia and intact proprioceptive function, no dysmetria was noted  Rapid alternating movement normal  Sensation was intact to vibration, light touch, and temperature in bilateral upper and lower extremities  Deep tendon reflexes were 2+ and symmetric in the bilateral upper and lower extremities  She is able to rise easily without assistance from a seated position  Casual gait is normal including stance, stride, and arm swing  Normal tandem gait  Romberg is absent  ROS:    Review of Systems  Constitutional: Negative  Negative for appetite change and fever  HENT: Negative  Negative for hearing loss, tinnitus, trouble swallowing and voice change  Eyes: Negative  Negative for photophobia, pain and visual disturbance  Respiratory: Negative  Negative for shortness of breath  Cardiovascular: Negative  Negative for palpitations  Gastrointestinal: Negative  Negative for nausea and vomiting  Endocrine: Negative  Negative for cold intolerance  Genitourinary: Negative  Negative for dysuria, frequency and urgency  Musculoskeletal: Negative  Negative for gait problem, myalgias and neck pain  Skin: Negative  Negative for rash  Allergic/Immunologic: Negative  Neurological: Positive for light-headedness (ocassionally)  Negative for dizziness, tremors, seizures, syncope, facial asymmetry, speech difficulty, weakness, numbness and headaches  Hematological: Negative  Does not bruise/bleed easily  Psychiatric/Behavioral: Negative  Negative for confusion, hallucinations and sleep disturbance       Reviewed ROS as entered by medical assistant

## 2022-12-29 NOTE — PATIENT INSTRUCTIONS
Headache/migraine treatment:   - When you have a moderate to severe headache, you should seek rest, initiate relaxation and apply cold compresses to the head  Abortive medications (for immediate treatment of a headache): It is ok to take ibuprofen, acetaminophen or naproxen (Advil, Tylenol,  Aleve, Excedrin) if they help your headaches you should limit these to no more than 3 times a week to avoid medication overuse/rebound headaches  Use Sumatriptan 100 mg +/- Ibuprofen 400 mg +/- Compazine 10 mg at the onset of your migraine symptoms      Over the counter preventive supplements for headaches/migraines   (to take every day to help prevent headaches - not to take at the time of headache):  [x] Magnesium 500mg daily (If any diarrhea or upset stomach, decrease dose  as tolerated)  [x] Riboflavin (Vitamin B2) 400mg daily (FYI B2 may make your urine bright/neon yellow) or a B complex        Self-Monitoring:  [x] Headache calendar  Each day jim a number from 0-10 indicating if there was a headache and how bad it was  This can be used to monitor gradual improvement and is helpful to make medication adjustments  You can do this on paper or there is an REGINALD for a smart phone called "Migraine e Diary"  Lifestyle Recommendations:  [x] SLEEP - Maintain a regular sleep schedule: Adults need at least 7-8 hours of uninterrupted a night  Maintain good sleep hygiene:  Going to bed and waking up at consistent times, avoiding excessive daytime naps, avoiding caffeinated beverages in the evening, avoid excessive stimulation in the evening and generally using bed primarily for sleeping  One hour before bedtime would recommend turning lights down lower, decreasing your activity (may read quietly, listen to music at a low volume)  When you get into bed, should eliminate all technology (no texting, emailing, playing with your phone, iPad or tablet in bed)  [x] HYDRATION - Maintain good hydration    Drink  2L of fluid a day (4 typical small water bottles)  [x] DIET - Maintain good nutrition  In particular don't skip meals and try and eat healthy balanced meals regularly  [x] TRIGGERS - Look for other triggers and avoid them: Limit caffeine to 1-2 cups a day or less  Avoid dietary triggers that you have noticed bring on your headaches (this could include aged cheese, peanuts, MSG, aspartame and nitrates)  [x] EXERCISE - physical exercise as we all know is good for you in many ways, and not only is good for your heart, but also is beneficial for your mental health, cognitive health and  chronic pain/headaches  I would encourage at the least 5 days of physical exercise weekly for at least 30 minutes  Education and Follow-up  [x] Please call with any questions or concerns  Of course if any new concerning symptoms go to the emergency department    [x] Follow up in 4 months, sooner if needed

## 2022-12-29 NOTE — PROGRESS NOTES
Patient ID: Wendi Carlisle is a 23 y o  female  Assessment/Plan:    No problem-specific Assessment & Plan notes found for this encounter  {Assess/PlanSmartLinks:59064}       Subjective:    HPI    {[facility] HPI texts:27226}    {Common ambulatory SmartLinks:74719}         Objective:    Blood pressure 110/60, pulse 70, temperature 98 5 °F (36 9 °C), temperature source Temporal, resp  rate 16, weight 58 6 kg (129 lb 3 2 oz), SpO2 99 %  Physical Exam    Neurological Exam      ROS:    Review of Systems   Constitutional: Negative  Negative for appetite change and fever  HENT: Negative  Negative for hearing loss, tinnitus, trouble swallowing and voice change  Eyes: Negative  Negative for photophobia, pain and visual disturbance  Respiratory: Negative  Negative for shortness of breath  Cardiovascular: Negative  Negative for palpitations  Gastrointestinal: Negative  Negative for nausea and vomiting  Endocrine: Negative  Negative for cold intolerance  Genitourinary: Negative  Negative for dysuria, frequency and urgency  Musculoskeletal: Negative  Negative for gait problem, myalgias and neck pain  Skin: Negative  Negative for rash  Allergic/Immunologic: Negative  Neurological: Positive for light-headedness  Negative for dizziness, tremors, seizures, syncope, facial asymmetry, speech difficulty, weakness, numbness and headaches  Hematological: Negative  Does not bruise/bleed easily  Psychiatric/Behavioral: Negative  Negative for confusion, hallucinations and sleep disturbance

## 2023-03-16 ENCOUNTER — TELEPHONE (OUTPATIENT)
Dept: NEUROLOGY | Facility: CLINIC | Age: 23
End: 2023-03-16

## 2023-03-16 NOTE — TELEPHONE ENCOUNTER
Called the patient in attempt to reschedule the appointment for 4/26   Left the message with the call back number

## 2023-03-24 NOTE — TELEPHONE ENCOUNTER
Called the patient regarding the appointment with Vanna that need to be rescheduled  Left the message with the call back number